# Patient Record
Sex: FEMALE | Race: BLACK OR AFRICAN AMERICAN | Employment: FULL TIME | URBAN - NONMETROPOLITAN AREA
[De-identification: names, ages, dates, MRNs, and addresses within clinical notes are randomized per-mention and may not be internally consistent; named-entity substitution may affect disease eponyms.]

---

## 2017-02-01 ENCOUNTER — OFFICE VISIT (OUTPATIENT)
Dept: PRIMARY CARE CLINIC | Age: 42
End: 2017-02-01
Payer: MEDICAID

## 2017-02-01 VITALS
HEART RATE: 74 BPM | HEIGHT: 65 IN | OXYGEN SATURATION: 98 % | DIASTOLIC BLOOD PRESSURE: 82 MMHG | SYSTOLIC BLOOD PRESSURE: 122 MMHG | TEMPERATURE: 97.4 F | BODY MASS INDEX: 33.32 KG/M2 | WEIGHT: 200 LBS

## 2017-02-01 DIAGNOSIS — I10 ESSENTIAL HYPERTENSION: Primary | ICD-10-CM

## 2017-02-01 PROCEDURE — 99213 OFFICE O/P EST LOW 20 MIN: CPT | Performed by: FAMILY MEDICINE

## 2017-02-01 ASSESSMENT — ENCOUNTER SYMPTOMS
SHORTNESS OF BREATH: 0
COUGH: 0

## 2017-08-01 ENCOUNTER — OFFICE VISIT (OUTPATIENT)
Dept: PRIMARY CARE CLINIC | Age: 42
End: 2017-08-01
Payer: MEDICAID

## 2017-08-01 VITALS
HEIGHT: 65 IN | HEART RATE: 88 BPM | TEMPERATURE: 97.2 F | WEIGHT: 199.8 LBS | OXYGEN SATURATION: 98 % | SYSTOLIC BLOOD PRESSURE: 120 MMHG | DIASTOLIC BLOOD PRESSURE: 72 MMHG | BODY MASS INDEX: 33.29 KG/M2

## 2017-08-01 DIAGNOSIS — I10 ESSENTIAL HYPERTENSION: ICD-10-CM

## 2017-08-01 PROCEDURE — 99213 OFFICE O/P EST LOW 20 MIN: CPT | Performed by: FAMILY MEDICINE

## 2017-08-01 RX ORDER — AMLODIPINE BESYLATE 5 MG/1
5 TABLET ORAL DAILY
Qty: 90 TABLET | Refills: 3 | Status: SHIPPED | OUTPATIENT
Start: 2017-08-01 | End: 2017-08-25 | Stop reason: SDUPTHER

## 2017-08-01 RX ORDER — METOPROLOL SUCCINATE 25 MG/1
25 TABLET, EXTENDED RELEASE ORAL DAILY
Qty: 90 TABLET | Refills: 3 | Status: SHIPPED | OUTPATIENT
Start: 2017-08-01 | End: 2017-08-25 | Stop reason: SDUPTHER

## 2017-08-01 ASSESSMENT — ENCOUNTER SYMPTOMS
COLOR CHANGE: 0
COUGH: 0
SHORTNESS OF BREATH: 0

## 2017-08-25 DIAGNOSIS — I10 ESSENTIAL HYPERTENSION: ICD-10-CM

## 2017-08-25 RX ORDER — METOPROLOL SUCCINATE 25 MG/1
TABLET, EXTENDED RELEASE ORAL
Qty: 90 TABLET | Refills: 2 | Status: SHIPPED | OUTPATIENT
Start: 2017-08-25 | End: 2018-09-20 | Stop reason: SDUPTHER

## 2017-08-25 RX ORDER — AMLODIPINE BESYLATE 5 MG/1
TABLET ORAL
Qty: 90 TABLET | Refills: 2 | Status: SHIPPED | OUTPATIENT
Start: 2017-08-25 | End: 2018-09-20 | Stop reason: SDUPTHER

## 2017-12-28 DIAGNOSIS — I10 ESSENTIAL HYPERTENSION: ICD-10-CM

## 2017-12-28 LAB
ALBUMIN SERPL-MCNC: 4 G/DL (ref 3.5–5.2)
ALP BLD-CCNC: 81 U/L (ref 35–104)
ALT SERPL-CCNC: 15 U/L (ref 5–33)
ANION GAP SERPL CALCULATED.3IONS-SCNC: 14 MMOL/L (ref 7–19)
AST SERPL-CCNC: 19 U/L (ref 5–32)
BILIRUB SERPL-MCNC: 0.3 MG/DL (ref 0.2–1.2)
BUN BLDV-MCNC: 13 MG/DL (ref 6–20)
CALCIUM SERPL-MCNC: 8.8 MG/DL (ref 8.6–10)
CHLORIDE BLD-SCNC: 99 MMOL/L (ref 98–111)
CHOLESTEROL, TOTAL: 191 MG/DL (ref 160–199)
CO2: 25 MMOL/L (ref 22–29)
CREAT SERPL-MCNC: 0.6 MG/DL (ref 0.5–0.9)
GFR NON-AFRICAN AMERICAN: >60
GLUCOSE BLD-MCNC: 92 MG/DL (ref 74–109)
HDLC SERPL-MCNC: 40 MG/DL (ref 65–121)
LDL CHOLESTEROL CALCULATED: 116 MG/DL
POTASSIUM SERPL-SCNC: 3.9 MMOL/L (ref 3.5–5)
SODIUM BLD-SCNC: 138 MMOL/L (ref 136–145)
TOTAL PROTEIN: 8.1 G/DL (ref 6.6–8.7)
TRIGL SERPL-MCNC: 177 MG/DL (ref 0–149)

## 2018-01-15 ENCOUNTER — TELEPHONE (OUTPATIENT)
Dept: PRIMARY CARE CLINIC | Age: 43
End: 2018-01-15

## 2018-01-15 NOTE — TELEPHONE ENCOUNTER
----- Message from SIS Lacey sent at 1/14/2018 12:51 PM CST -----  Please notify patient of normal results. HDL is slightly low, triglycerides are low high I will tweak diet sticking to a healthy heart diet. 30 minutes of low impact aerobic exercise daily I will follow up as scheduled!

## 2018-05-04 ENCOUNTER — APPOINTMENT (OUTPATIENT)
Dept: CT IMAGING | Age: 43
End: 2018-05-04
Payer: MEDICAID

## 2018-05-04 ENCOUNTER — HOSPITAL ENCOUNTER (EMERGENCY)
Age: 43
Discharge: HOME | End: 2018-05-04
Attending: EMERGENCY MEDICINE
Payer: MEDICAID

## 2018-05-04 VITALS
HEART RATE: 70 BPM | DIASTOLIC BLOOD PRESSURE: 75 MMHG | SYSTOLIC BLOOD PRESSURE: 128 MMHG | WEIGHT: 199 LBS | OXYGEN SATURATION: 99 % | TEMPERATURE: 98.7 F | RESPIRATION RATE: 16 BRPM | BODY MASS INDEX: 31.98 KG/M2 | HEIGHT: 66 IN

## 2018-05-04 DIAGNOSIS — I45.6 WOLFF PARKINSON WHITE PATTERN SEEN ON ELECTROCARDIOGRAM: ICD-10-CM

## 2018-05-04 DIAGNOSIS — R42 DIZZINESS: Primary | ICD-10-CM

## 2018-05-04 LAB
ALBUMIN SERPL-MCNC: 4.3 G/DL (ref 3.5–5.2)
ALP BLD-CCNC: 90 U/L (ref 35–104)
ALT SERPL-CCNC: 13 U/L (ref 5–33)
ANION GAP SERPL CALCULATED.3IONS-SCNC: 12 MMOL/L (ref 7–19)
AST SERPL-CCNC: 17 U/L (ref 5–32)
BASOPHILS ABSOLUTE: 0 K/UL (ref 0–0.2)
BASOPHILS RELATIVE PERCENT: 0.5 % (ref 0–1)
BILIRUB SERPL-MCNC: <0.2 MG/DL (ref 0.2–1.2)
BUN BLDV-MCNC: 9 MG/DL (ref 6–20)
CALCIUM SERPL-MCNC: 9.5 MG/DL (ref 8.6–10)
CHLORIDE BLD-SCNC: 99 MMOL/L (ref 98–111)
CO2: 27 MMOL/L (ref 22–29)
CREAT SERPL-MCNC: 0.5 MG/DL (ref 0.5–0.9)
EOSINOPHILS ABSOLUTE: 0 K/UL (ref 0–0.6)
EOSINOPHILS RELATIVE PERCENT: 0.5 % (ref 0–5)
GFR NON-AFRICAN AMERICAN: >60
GLUCOSE BLD-MCNC: 128 MG/DL (ref 74–109)
HCG QUALITATIVE: NEGATIVE
HCT VFR BLD CALC: 44.3 % (ref 37–47)
HEMOGLOBIN: 14.2 G/DL (ref 12–16)
LYMPHOCYTES ABSOLUTE: 2 K/UL (ref 1.1–4.5)
LYMPHOCYTES RELATIVE PERCENT: 31.9 % (ref 20–40)
MCH RBC QN AUTO: 27.5 PG (ref 27–31)
MCHC RBC AUTO-ENTMCNC: 32.1 G/DL (ref 33–37)
MCV RBC AUTO: 85.7 FL (ref 81–99)
MONOCYTES ABSOLUTE: 0.3 K/UL (ref 0–0.9)
MONOCYTES RELATIVE PERCENT: 5.2 % (ref 0–10)
NEUTROPHILS ABSOLUTE: 3.8 K/UL (ref 1.5–7.5)
NEUTROPHILS RELATIVE PERCENT: 61.6 % (ref 50–65)
PDW BLD-RTO: 13.1 % (ref 11.5–14.5)
PLATELET # BLD: 274 K/UL (ref 130–400)
PMV BLD AUTO: 9.5 FL (ref 9.4–12.3)
POTASSIUM SERPL-SCNC: 3.4 MMOL/L (ref 3.5–5)
RBC # BLD: 5.17 M/UL (ref 4.2–5.4)
SODIUM BLD-SCNC: 138 MMOL/L (ref 136–145)
TOTAL PROTEIN: 8.6 G/DL (ref 6.6–8.7)
TROPONIN: <0.01 NG/ML (ref 0–0.03)
WBC # BLD: 6.1 K/UL (ref 4.8–10.8)

## 2018-05-04 PROCEDURE — 84484 ASSAY OF TROPONIN QUANT: CPT

## 2018-05-04 PROCEDURE — 93005 ELECTROCARDIOGRAM TRACING: CPT

## 2018-05-04 PROCEDURE — 70450 CT HEAD/BRAIN W/O DYE: CPT

## 2018-05-04 PROCEDURE — 2580000003 HC RX 258: Performed by: EMERGENCY MEDICINE

## 2018-05-04 PROCEDURE — 99285 EMERGENCY DEPT VISIT HI MDM: CPT | Performed by: EMERGENCY MEDICINE

## 2018-05-04 PROCEDURE — 80053 COMPREHEN METABOLIC PANEL: CPT

## 2018-05-04 PROCEDURE — 93229 REMOTE 30 DAY ECG TECH SUPP: CPT

## 2018-05-04 PROCEDURE — 6370000000 HC RX 637 (ALT 250 FOR IP): Performed by: EMERGENCY MEDICINE

## 2018-05-04 PROCEDURE — 85025 COMPLETE CBC W/AUTO DIFF WBC: CPT

## 2018-05-04 PROCEDURE — 99284 EMERGENCY DEPT VISIT MOD MDM: CPT

## 2018-05-04 PROCEDURE — 36415 COLL VENOUS BLD VENIPUNCTURE: CPT

## 2018-05-04 PROCEDURE — 84703 CHORIONIC GONADOTROPIN ASSAY: CPT

## 2018-05-04 RX ORDER — 0.9 % SODIUM CHLORIDE 0.9 %
1000 INTRAVENOUS SOLUTION INTRAVENOUS ONCE
Status: COMPLETED | OUTPATIENT
Start: 2018-05-04 | End: 2018-05-04

## 2018-05-04 RX ORDER — MECLIZINE HCL 12.5 MG/1
25 TABLET ORAL ONCE
Status: COMPLETED | OUTPATIENT
Start: 2018-05-04 | End: 2018-05-04

## 2018-05-04 RX ADMIN — MECLIZINE 25 MG: 12.5 TABLET ORAL at 07:08

## 2018-05-04 RX ADMIN — SODIUM CHLORIDE 1000 ML: 9 INJECTION, SOLUTION INTRAVENOUS at 07:08

## 2018-05-04 ASSESSMENT — ENCOUNTER SYMPTOMS
VOMITING: 0
ABDOMINAL PAIN: 0
SORE THROAT: 0
BACK PAIN: 0
RHINORRHEA: 0
NAUSEA: 0
DIARRHEA: 0
SHORTNESS OF BREATH: 0

## 2018-05-04 ASSESSMENT — PAIN DESCRIPTION - LOCATION: LOCATION: HEAD

## 2018-05-04 ASSESSMENT — PAIN SCALES - GENERAL: PAINLEVEL_OUTOF10: 9

## 2018-05-07 ENCOUNTER — OFFICE VISIT (OUTPATIENT)
Dept: PRIMARY CARE CLINIC | Age: 43
End: 2018-05-07
Payer: MEDICAID

## 2018-05-07 VITALS
WEIGHT: 199 LBS | BODY MASS INDEX: 31.98 KG/M2 | TEMPERATURE: 98 F | DIASTOLIC BLOOD PRESSURE: 74 MMHG | HEIGHT: 66 IN | OXYGEN SATURATION: 100 % | RESPIRATION RATE: 20 BRPM | HEART RATE: 77 BPM | SYSTOLIC BLOOD PRESSURE: 110 MMHG

## 2018-05-07 DIAGNOSIS — R94.31 ABNORMAL EKG: ICD-10-CM

## 2018-05-07 DIAGNOSIS — R00.2 PALPITATIONS: Primary | ICD-10-CM

## 2018-05-07 DIAGNOSIS — R00.2 PALPITATIONS: ICD-10-CM

## 2018-05-07 LAB
EKG P AXIS: 60 DEGREES
EKG P-R INTERVAL: 114 MS
EKG Q-T INTERVAL: 402 MS
EKG QRS DURATION: 114 MS
EKG QTC CALCULATION (BAZETT): 444 MS
EKG T AXIS: 35 DEGREES

## 2018-05-07 PROCEDURE — 99214 OFFICE O/P EST MOD 30 MIN: CPT | Performed by: FAMILY MEDICINE

## 2018-05-07 ASSESSMENT — ENCOUNTER SYMPTOMS
DIARRHEA: 0
NAUSEA: 0
VOMITING: 0
ABDOMINAL PAIN: 0
WHEEZING: 0
SHORTNESS OF BREATH: 0
CONSTIPATION: 0
COUGH: 0
CHEST TIGHTNESS: 0

## 2018-05-09 LAB — TSH, 3RD GENERATION: 2.7 MU/L (ref 0.3–4)

## 2018-05-30 ENCOUNTER — OFFICE VISIT (OUTPATIENT)
Dept: PRIMARY CARE CLINIC | Age: 43
End: 2018-05-30
Payer: MEDICAID

## 2018-05-30 VITALS
OXYGEN SATURATION: 99 % | RESPIRATION RATE: 20 BRPM | SYSTOLIC BLOOD PRESSURE: 120 MMHG | WEIGHT: 199 LBS | DIASTOLIC BLOOD PRESSURE: 80 MMHG | HEART RATE: 70 BPM | BODY MASS INDEX: 31.98 KG/M2 | HEIGHT: 66 IN | TEMPERATURE: 98 F

## 2018-05-30 DIAGNOSIS — Z87.898 HISTORY OF PALPITATIONS: Primary | ICD-10-CM

## 2018-05-30 PROCEDURE — 99213 OFFICE O/P EST LOW 20 MIN: CPT | Performed by: FAMILY MEDICINE

## 2018-05-30 ASSESSMENT — ENCOUNTER SYMPTOMS
VOMITING: 0
CHEST TIGHTNESS: 0
COUGH: 0
CONSTIPATION: 0
SHORTNESS OF BREATH: 0
ABDOMINAL PAIN: 0
WHEEZING: 0
DIARRHEA: 0
NAUSEA: 0

## 2018-05-30 ASSESSMENT — PATIENT HEALTH QUESTIONNAIRE - PHQ9
SUM OF ALL RESPONSES TO PHQ9 QUESTIONS 1 & 2: 0
SUM OF ALL RESPONSES TO PHQ QUESTIONS 1-9: 0
1. LITTLE INTEREST OR PLEASURE IN DOING THINGS: 0
2. FEELING DOWN, DEPRESSED OR HOPELESS: 0

## 2018-06-11 PROCEDURE — 0298T PR EXT ECG > 48HR TO 21 DAY REVIEW AND INTERPRETATN: CPT | Performed by: INTERNAL MEDICINE

## 2018-09-20 DIAGNOSIS — I10 ESSENTIAL HYPERTENSION: ICD-10-CM

## 2018-09-20 RX ORDER — AMLODIPINE BESYLATE 5 MG/1
TABLET ORAL
Qty: 90 TABLET | Refills: 0 | Status: SHIPPED | OUTPATIENT
Start: 2018-09-20 | End: 2018-12-23 | Stop reason: SDUPTHER

## 2018-09-20 RX ORDER — METOPROLOL SUCCINATE 25 MG/1
TABLET, EXTENDED RELEASE ORAL
Qty: 90 TABLET | Refills: 0 | Status: SHIPPED | OUTPATIENT
Start: 2018-09-20 | End: 2018-12-23 | Stop reason: SDUPTHER

## 2019-01-28 DIAGNOSIS — I10 ESSENTIAL HYPERTENSION: ICD-10-CM

## 2019-01-28 RX ORDER — METOPROLOL SUCCINATE 25 MG/1
TABLET, EXTENDED RELEASE ORAL
Qty: 30 TABLET | Refills: 0 | Status: SHIPPED | OUTPATIENT
Start: 2019-01-28 | End: 2019-02-01 | Stop reason: SDUPTHER

## 2019-01-28 RX ORDER — AMLODIPINE BESYLATE 5 MG/1
TABLET ORAL
Qty: 30 TABLET | Refills: 0 | Status: SHIPPED | OUTPATIENT
Start: 2019-01-28 | End: 2019-02-01 | Stop reason: SDUPTHER

## 2019-01-30 RX ORDER — AMLODIPINE BESYLATE 5 MG/1
TABLET ORAL
Qty: 30 TABLET | Refills: 3 | OUTPATIENT
Start: 2019-01-30

## 2019-01-30 RX ORDER — METOPROLOL SUCCINATE 25 MG/1
TABLET, EXTENDED RELEASE ORAL
Qty: 30 TABLET | Refills: 3 | OUTPATIENT
Start: 2019-01-30

## 2019-02-01 ENCOUNTER — OFFICE VISIT (OUTPATIENT)
Dept: PRIMARY CARE CLINIC | Age: 44
End: 2019-02-01
Payer: MEDICAID

## 2019-02-01 VITALS
DIASTOLIC BLOOD PRESSURE: 80 MMHG | HEART RATE: 90 BPM | SYSTOLIC BLOOD PRESSURE: 120 MMHG | TEMPERATURE: 98.9 F | OXYGEN SATURATION: 99 % | BODY MASS INDEX: 32.99 KG/M2 | HEIGHT: 65 IN | WEIGHT: 198 LBS

## 2019-02-01 DIAGNOSIS — I10 ESSENTIAL HYPERTENSION: ICD-10-CM

## 2019-02-01 PROCEDURE — 99212 OFFICE O/P EST SF 10 MIN: CPT | Performed by: NURSE PRACTITIONER

## 2019-02-01 RX ORDER — AMLODIPINE BESYLATE 5 MG/1
TABLET ORAL
Qty: 30 TABLET | Refills: 11 | Status: SHIPPED | OUTPATIENT
Start: 2019-02-01 | End: 2019-08-09 | Stop reason: SDUPTHER

## 2019-02-01 RX ORDER — METOPROLOL SUCCINATE 25 MG/1
TABLET, EXTENDED RELEASE ORAL
Qty: 30 TABLET | Refills: 11 | Status: SHIPPED | OUTPATIENT
Start: 2019-02-01 | End: 2019-08-09 | Stop reason: SDUPTHER

## 2019-02-04 ASSESSMENT — ENCOUNTER SYMPTOMS
SINUS PRESSURE: 0
VOMITING: 0
COUGH: 0
SINUS PAIN: 0
DIARRHEA: 0
BACK PAIN: 0
EYE PAIN: 0
ABDOMINAL PAIN: 0
SHORTNESS OF BREATH: 0
WHEEZING: 0
NAUSEA: 0

## 2019-08-09 ENCOUNTER — OFFICE VISIT (OUTPATIENT)
Dept: PRIMARY CARE CLINIC | Age: 44
End: 2019-08-09
Payer: MEDICAID

## 2019-08-09 VITALS
WEIGHT: 200.8 LBS | HEART RATE: 89 BPM | HEIGHT: 66 IN | BODY MASS INDEX: 32.27 KG/M2 | TEMPERATURE: 98 F | SYSTOLIC BLOOD PRESSURE: 130 MMHG | OXYGEN SATURATION: 99 % | DIASTOLIC BLOOD PRESSURE: 86 MMHG

## 2019-08-09 DIAGNOSIS — E87.6 HYPOKALEMIA: ICD-10-CM

## 2019-08-09 DIAGNOSIS — I10 ESSENTIAL HYPERTENSION: ICD-10-CM

## 2019-08-09 DIAGNOSIS — I10 ESSENTIAL HYPERTENSION: Primary | ICD-10-CM

## 2019-08-09 LAB
ANION GAP SERPL CALCULATED.3IONS-SCNC: 14 MMOL/L (ref 7–19)
BUN BLDV-MCNC: 14 MG/DL (ref 6–20)
CALCIUM SERPL-MCNC: 9.2 MG/DL (ref 8.6–10)
CHLORIDE BLD-SCNC: 100 MMOL/L (ref 98–111)
CO2: 25 MMOL/L (ref 22–29)
CREAT SERPL-MCNC: 0.5 MG/DL (ref 0.5–0.9)
GFR NON-AFRICAN AMERICAN: >60
GLUCOSE BLD-MCNC: 98 MG/DL (ref 74–109)
POTASSIUM SERPL-SCNC: 3.3 MMOL/L (ref 3.5–5)
SODIUM BLD-SCNC: 139 MMOL/L (ref 136–145)

## 2019-08-09 PROCEDURE — 99213 OFFICE O/P EST LOW 20 MIN: CPT | Performed by: NURSE PRACTITIONER

## 2019-08-09 RX ORDER — POTASSIUM CHLORIDE 20 MEQ/1
20 TABLET, EXTENDED RELEASE ORAL ONCE
Qty: 1 TABLET | Refills: 0 | Status: SHIPPED | OUTPATIENT
Start: 2019-08-09 | End: 2019-08-09

## 2019-08-09 RX ORDER — AMLODIPINE BESYLATE 5 MG/1
TABLET ORAL
Qty: 30 TABLET | Refills: 11 | Status: SHIPPED | OUTPATIENT
Start: 2019-08-09 | End: 2020-08-19 | Stop reason: SDUPTHER

## 2019-08-09 RX ORDER — METOPROLOL SUCCINATE 25 MG/1
TABLET, EXTENDED RELEASE ORAL
Qty: 30 TABLET | Refills: 11 | Status: SHIPPED | OUTPATIENT
Start: 2019-08-09 | End: 2020-08-19 | Stop reason: SDUPTHER

## 2019-08-09 ASSESSMENT — PATIENT HEALTH QUESTIONNAIRE - PHQ9
SUM OF ALL RESPONSES TO PHQ QUESTIONS 1-9: 0
SUM OF ALL RESPONSES TO PHQ9 QUESTIONS 1 & 2: 0
2. FEELING DOWN, DEPRESSED OR HOPELESS: 0
1. LITTLE INTEREST OR PLEASURE IN DOING THINGS: 0
SUM OF ALL RESPONSES TO PHQ QUESTIONS 1-9: 0

## 2019-08-09 NOTE — PROGRESS NOTES
[facility]   [address] [place] [address], [facility]     Phone:  [phone]  Fax:  [phone]      Juan Carlos Velez is a 41 y.o. female who presents today for her medical conditions/complaints as noted below. Juan Carlos Velez is c/o of Hypertension (6 month follow up)      Chief Complaint   Patient presents with    Hypertension     6 month follow up       HPI:     HPI    Juan Carlos Velez presents today for a 6 month follow up on HTN. She states she is feeling well and taking her medications as prescribed. She attempts to eat a low-sodium diet, but does not always do this. She has no new symptoms or side effects. She denies chest pain, shortness of breath, or edema. She does not smoke. Past Medical History:   Diagnosis Date    Hypertension         Past Surgical History:   Procedure Laterality Date    TUBAL LIGATION Bilateral -5y    TUBAL LIGATION         Social History     Tobacco Use    Smoking status: Never Smoker    Smokeless tobacco: Never Used   Substance Use Topics    Alcohol use: Yes     Comment: occasional        Current Outpatient Medications   Medication Sig Dispense Refill    amLODIPine (NORVASC) 5 MG tablet TAKE 1 TABLET BY MOUTH EVERY DAY 30 tablet 11    metoprolol succinate (TOPROL XL) 25 MG extended release tablet TAKE 1 TABLET BY MOUTH DAILY 30 tablet 11    potassium chloride (KLOR-CON M) 20 MEQ extended release tablet Take 1 tablet by mouth once for 1 dose 1 tablet 0     No current facility-administered medications for this visit. Allergies   Allergen Reactions    Pollen Extract Other (See Comments)       Family History   Problem Relation Age of Onset    Cancer Mother                Review of Systems   Constitutional: Negative for appetite change, fatigue and fever. Eyes: Negative for visual disturbance. Respiratory: Negative for cough, shortness of breath and wheezing. Cardiovascular: Negative for chest pain and leg swelling. Gastrointestinal: Negative. Genitourinary: Negative. Neurological: Negative for dizziness, weakness and headaches. Objective:     Physical Exam   Constitutional: She is oriented to person, place, and time. She appears well-developed and well-nourished. No distress. HENT:   Head: Normocephalic and atraumatic. Eyes: Pupils are equal, round, and reactive to light. Neck: Normal range of motion. Neck supple. Cardiovascular: Normal rate, regular rhythm, normal heart sounds and intact distal pulses. Pulmonary/Chest: Effort normal and breath sounds normal. No respiratory distress. She has no wheezes. Musculoskeletal: Normal range of motion. She exhibits no edema. Lumbar back: She exhibits normal range of motion. Lymphadenopathy:     She has no cervical adenopathy. Neurological: She is alert and oriented to person, place, and time. Skin: Skin is warm and dry. No rash noted. Psychiatric: She has a normal mood and affect. Nursing note and vitals reviewed. /86   Pulse 89   Temp 98 °F (36.7 °C) (Temporal)   Ht 5' 6\" (1.676 m)   Wt 200 lb 12.8 oz (91.1 kg)   SpO2 99%   BMI 32.41 kg/m²     Assessment:      Diagnosis Orders   1. Essential hypertension  amLODIPine (NORVASC) 5 MG tablet    metoprolol succinate (TOPROL XL) 25 MG extended release tablet   2. Hypokalemia  Potassium    potassium chloride (KLOR-CON M) 20 MEQ extended release tablet       No results found for this visit on 08/09/19. Plan:     BMP ordered. Potassium noted to be low. Will order one-time dose of potassium and encouraged to eat potassium rich foods. Please check potassium in 1 week. Medications reordered. Please continue current medications. Blood pressure is better controlled.     Return in about 6 months (around 2/9/2020), or if symptoms worsen or fail to improve, for Physical.    Orders Placed This Encounter   Procedures    Potassium     Standing Status:   Future     Standing Expiration Date:   8/9/2020       Orders

## 2019-08-12 ASSESSMENT — ENCOUNTER SYMPTOMS
GASTROINTESTINAL NEGATIVE: 1
COUGH: 0
WHEEZING: 0
SHORTNESS OF BREATH: 0

## 2019-08-13 ENCOUNTER — TELEPHONE (OUTPATIENT)
Dept: PRIMARY CARE CLINIC | Age: 44
End: 2019-08-13

## 2020-07-20 ENCOUNTER — OFFICE VISIT (OUTPATIENT)
Age: 45
End: 2020-07-20
Payer: MEDICAID

## 2020-07-20 VITALS
HEIGHT: 67 IN | SYSTOLIC BLOOD PRESSURE: 160 MMHG | OXYGEN SATURATION: 94 % | RESPIRATION RATE: 16 BRPM | TEMPERATURE: 96.1 F | DIASTOLIC BLOOD PRESSURE: 90 MMHG | BODY MASS INDEX: 26.68 KG/M2 | WEIGHT: 170 LBS | HEART RATE: 140 BPM

## 2020-07-20 PROCEDURE — 99213 OFFICE O/P EST LOW 20 MIN: CPT | Performed by: NURSE PRACTITIONER

## 2020-07-20 RX ORDER — AMOXICILLIN AND CLAVULANATE POTASSIUM 875; 125 MG/1; MG/1
1 TABLET, FILM COATED ORAL 2 TIMES DAILY
Qty: 20 TABLET | Refills: 0 | Status: SHIPPED | OUTPATIENT
Start: 2020-07-20 | End: 2020-07-30

## 2020-07-20 ASSESSMENT — ENCOUNTER SYMPTOMS
SINUS PRESSURE: 1
NAUSEA: 0
RHINORRHEA: 0
COUGH: 0
ABDOMINAL PAIN: 0
SORE THROAT: 0
VOMITING: 0
SHORTNESS OF BREATH: 0
SINUS PAIN: 1
DIARRHEA: 0

## 2020-07-20 NOTE — PATIENT INSTRUCTIONS
Patient Education        Sinusitis: Care Instructions  Your Care Instructions        Sinusitis is an infection of the lining of the sinus cavities in your head. Sinusitis often follows a cold. It causes pain and pressure in your head and face. In most cases, sinusitis gets better on its own in 1 to 2 weeks. But some mild symptoms may last for several weeks. Sometimes antibiotics are needed. Follow-up care is a key part of your treatment and safety. Be sure to make and go to all appointments, and call your doctor if you are having problems. It's also a good idea to know your test results and keep a list of the medicines you take. How can you care for yourself at home? · Take an over-the-counter pain medicine, such as acetaminophen (Tylenol), ibuprofen (Advil, Motrin), or naproxen (Aleve). Read and follow all instructions on the label. · If the doctor prescribed antibiotics, take them as directed. Do not stop taking them just because you feel better. You need to take the full course of antibiotics. · Be careful when taking over-the-counter cold or flu medicines and Tylenol at the same time. Many of these medicines have acetaminophen, which is Tylenol. Read the labels to make sure that you are not taking more than the recommended dose. Too much acetaminophen (Tylenol) can be harmful. · Breathe warm, moist air from a steamy shower, a hot bath, or a sink filled with hot water. Avoid cold, dry air. Using a humidifier in your home may help. Follow the directions for cleaning the machine. · Use saline (saltwater) nasal washes to help keep your nasal passages open and wash out mucus and bacteria. You can buy saline nose drops at a grocery store or drugstore. Or you can make your own at home by adding 1 teaspoon of salt and 1 teaspoon of baking soda to 2 cups of distilled water. If you make your own, fill a bulb syringe with the solution, insert the tip into your nostril, and squeeze gently. Canda Plan your nose.   · Put a hot, wet towel or a warm gel pack on your face 3 or 4 times a day for 5 to 10 minutes each time. · Try a decongestant nasal spray like oxymetazoline (Afrin). Do not use it for more than 3 days in a row. Using it for more than 3 days can make your congestion worse. When should you call for help? Call your doctor now or seek immediate medical care if:  · You have new or worse swelling or redness in your face or around your eyes. · You have a new or higher fever. Watch closely for changes in your health, and be sure to contact your doctor if:  · You have new or worse facial pain. · The mucus from your nose becomes thicker (like pus) or has new blood in it. · You are not getting better as expected. Where can you learn more? Go to https://EyestormpeR&M Engineeringeb.[employer]. org and sign in to your Artaic account. Enter G948 in the [employer] box to learn more about \"Sinusitis: Care Instructions. \"     If you do not have an account, please click on the \"Sign Up Now\" link. Current as of: July 29, 2019               Content Version: 12.5  © 4040-0986 [employer]. Care instructions adapted under license by [facility] ([facility]). If you have questions about a medical condition or this instruction, always ask your healthcare professional. [address] any warranty or liability for your use of this information. Patient Education        Learning About Coronavirus [phone])  Coronavirus [phone]): Overview  What is coronavirus (MUJCQ-50)? The coronavirus disease (COVID-19) is caused by a virus. It is an illness that was first found in [place], [place], in December 2019. It has since spread worldwide. The virus can cause fever, cough, and trouble breathing. In severe cases, it can cause pneumonia and make it hard to breathe without help. It can cause death. Coronaviruses are a large group of viruses. They cause the common cold.  They also cause more serious illnesses like Middle East respiratory syndrome (MERS) and severe acute respiratory syndrome (SARS). COVID-19 is caused by a novel coronavirus. That means it's a new type that has not been seen in people before. This virus spreads person-to-person through droplets from coughing and sneezing. It can also spread when you are close to someone who is infected. And it can spread when you touch something that has the virus on it, such as a doorknob or a tabletop. What can you do to protect yourself from coronavirus (COVID-19)? The best way to protect yourself from getting sick is to:  · Avoid areas where there is an outbreak. · Avoid contact with people who may be infected. · Wash your hands often with soap or alcohol-based hand sanitizers. · Avoid crowds and try to stay at least 6 feet away from other people. · Wash your hands often, especially after you cough or sneeze. Use soap and water, and scrub for at least 20 seconds. If soap and water aren't available, use an alcohol-based hand . · Avoid touching your mouth, nose, and eyes. What can you do to avoid spreading the virus to others? To help avoid spreading the virus to others:  · Cover your mouth with a tissue when you cough or sneeze. Then throw the tissue in the trash. · Use a disinfectant to clean things that you touch often. · Wear a cloth face cover if you have to go to public areas. · Stay home if you are sick or have been exposed to the virus. Don't go to school, work, or public areas. And don't use public transportation, ride-shares, or taxis unless you have no choice. · If you are sick:  ? Leave your home only if you need to get medical care. But call the doctor's office first so they know you're coming. And wear a face cover. ? Wear the face cover whenever you're around other people. It can help stop the spread of the virus when you cough or sneeze. ? Clean and disinfect your home every day. Use household  and disinfectant wipes or sprays. until results are called to you. If positive you will have to quarantine for 14days. If positive the health dept will also contact you. 3. Monitor for fever and treat as needed with tylenol or ibuprofen  4. If patient is not improving or developing any new/worsening symptoms then return to clinic as needed or go to ER. Patient is to follow up with PCP as needed.        Antibiotic as prescribed

## 2020-07-20 NOTE — PROGRESS NOTES
[address]  [address] [address]  Dept: [phone]  Dept Fax: [phone]  Loc: [phone]      Watsonnay Magdi is c/o of Headache        HPI:     Patient complains of 2 day(s) history of headache. Symptoms have been worsening with time. She denies any other symptoms . Reported history of sinus infections. States it feels similar. Works at [employer]. Smoking history:  She  reports that she has never smoked. She has never used smokeless tobacco.     She has had no known ill contacts. Treatment to date: Acetaminophen. Recent travel or possible COVID exposure no    Past Medical History:   Diagnosis Date    Hypertension       Current Outpatient Medications   Medication Sig Dispense Refill    amoxicillin-clavulanate (AUGMENTIN) 875-125 MG per tablet Take 1 tablet by mouth 2 times daily for 10 days 20 tablet 0    amLODIPine (NORVASC) 5 MG tablet TAKE 1 TABLET BY MOUTH EVERY DAY 30 tablet 11    metoprolol succinate (TOPROL XL) 25 MG extended release tablet TAKE 1 TABLET BY MOUTH DAILY 30 tablet 11    potassium chloride (KLOR-CON M) 20 MEQ extended release tablet Take 1 tablet by mouth once for 1 dose 1 tablet 0     No current facility-administered medications for this visit.       Allergies   Allergen Reactions    Pollen Extract Other (See Comments)       Health Maintenance   Topic Date Due    DTaP/Tdap/Td vaccine (1 - Tdap) 08/09/2020 (Originally 10/11/1996)    Cervical cancer screen  08/09/2020 (Originally 8/5/2018)    HIV screen  08/09/2020 (Originally 10/11/1992)    Potassium monitoring  08/09/2020    Creatinine monitoring  08/09/2020    Flu vaccine (1) 09/01/2020    Lipid screen  12/28/2022    Hepatitis A vaccine  Aged Out    Hepatitis B vaccine  Aged Out    Hib vaccine  Aged Out    Meningococcal (ACWY) vaccine  Aged Out    Pneumococcal 0-64 years Vaccine  Aged Out       Subjective:      Review of Systems   Constitutional: Negative for chills, fatigue and fever. HENT: Positive for congestion, sinus pressure and sinus pain. Negative for ear pain, rhinorrhea and sore throat. Respiratory: Negative for cough and shortness of breath. Gastrointestinal: Negative for abdominal pain, diarrhea, nausea and vomiting. Skin: Negative for rash. Neurological: Positive for headaches. All other systems reviewed and are negative. Objective:     Physical Exam  Vitals signs and nursing note reviewed. Constitutional:       General: She is not in acute distress. Appearance: Normal appearance. She is well-developed. She is ill-appearing. She is not diaphoretic. HENT:      Head: Normocephalic and atraumatic. Right Ear: Tympanic membrane, ear canal and external ear normal.      Left Ear: Tympanic membrane, ear canal and external ear normal.      Nose: Congestion present. Mouth/Throat:      Mouth: Mucous membranes are moist.      Pharynx: Oropharynx is clear. No posterior oropharyngeal erythema. Eyes:      Conjunctiva/sclera: Conjunctivae normal.      Pupils: Pupils are equal, round, and reactive to light. Neck:      Musculoskeletal: Normal range of motion. Cardiovascular:      Rate and Rhythm: Normal rate and regular rhythm. Heart sounds: Normal heart sounds. No murmur. Pulmonary:      Effort: Pulmonary effort is normal. No respiratory distress. Breath sounds: Normal breath sounds. No wheezing. Skin:     General: Skin is warm and dry. Findings: No rash. Neurological:      Mental Status: She is alert and oriented to person, place, and time. Psychiatric:         Mood and Affect: Mood normal.         Behavior: Behavior normal.       BP (!) 160/90 (Site: Right Upper Arm)   Pulse 140   Temp 96.1 °F (35.6 °C) (Infrared)   Resp 16   Ht 5' 7\" (1.702 m)   Wt 170 lb (77.1 kg)   SpO2 94%   BMI 26.63 kg/m²   No results found for this visit on 07/20/20.   Assessment/ Plan Diagnosis Orders   1. Acute sinusitis, recurrence not specified, unspecified location  amoxicillin-clavulanate (AUGMENTIN) 875-125 MG per tablet   2. 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 BP elevated today. Pt has history HTN is taking her medicine. 1. Rest and increase fluid intake. 2. Covid-19 testing. Quarantine at home until results are called to you. If positive you will have to quarantine for 14days. If positive the health dept will also contact you. 3. Monitor for fever and treat as needed with tylenol or ibuprofen  4. If patient is not improving or developing any new/worsening symptoms then return to clinic as needed or go to ER. Patient is to follow up with PCP as needed. Antibiotic as prescribed       Patient Education        Sinusitis: Care Instructions  Your Care Instructions        Sinusitis is an infection of the lining of the sinus cavities in your head. Sinusitis often follows a cold. It causes pain and pressure in your head and face. In most cases, sinusitis gets better on its own in 1 to 2 weeks. But some mild symptoms may last for several weeks. Sometimes antibiotics are needed. Follow-up care is a key part of your treatment and safety. Be sure to make and go to all appointments, and call your doctor if you are having problems. It's also a good idea to know your test results and keep a list of the medicines you take. How can you care for yourself at home? · Take an over-the-counter pain medicine, such as acetaminophen (Tylenol), ibuprofen (Advil, Motrin), or naproxen (Aleve). Read and follow all instructions on the label. · If the doctor prescribed antibiotics, take them as directed. Do not stop taking them just because you feel better. You need to take the full course of antibiotics. · Be careful when taking over-the-counter cold or flu medicines and Tylenol at the same time.  Many of these medicines have acetaminophen, which is Tylenol. Read the labels to make sure that you are not taking more than the recommended dose. Too much acetaminophen (Tylenol) can be harmful. · Breathe warm, moist air from a steamy shower, a hot bath, or a sink filled with hot water. Avoid cold, dry air. Using a humidifier in your home may help. Follow the directions for cleaning the machine. · Use saline (saltwater) nasal washes to help keep your nasal passages open and wash out mucus and bacteria. You can buy saline nose drops at a grocery store or drugstore. Or you can make your own at home by adding 1 teaspoon of salt and 1 teaspoon of baking soda to 2 cups of distilled water. If you make your own, fill a bulb syringe with the solution, insert the tip into your nostril, and squeeze gently. Libra Led your nose. · Put a hot, wet towel or a warm gel pack on your face 3 or 4 times a day for 5 to 10 minutes each time. · Try a decongestant nasal spray like oxymetazoline (Afrin). Do not use it for more than 3 days in a row. Using it for more than 3 days can make your congestion worse. When should you call for help? Call your doctor now or seek immediate medical care if:  · You have new or worse swelling or redness in your face or around your eyes. · You have a new or higher fever. Watch closely for changes in your health, and be sure to contact your doctor if:  · You have new or worse facial pain. · The mucus from your nose becomes thicker (like pus) or has new blood in it. · You are not getting better as expected. Where can you learn more? Go to https://FactorlipePublereb.Itugo. org and sign in to your Shortlist account. Enter S407 in the [facility] box to learn more about \"Sinusitis: Care Instructions. \"     If you do not have an account, please click on the \"Sign Up Now\" link. Current as of: July 29, 2019               Content Version: 12.5  © 7544-5578 [employer].    Care instructions adapted under license by [facility] ([facility]). If you have questions about a medical condition or this instruction, always ask your healthcare professional. [address] any warranty or liability for your use of this information. Patient Education        Learning About Coronavirus [phone])  Coronavirus [phone]): Overview  What is coronavirus (BGKUC-09)? The coronavirus disease (COVID-19) is caused by a virus. It is an illness that was first found in [place], [place], in December 2019. It has since spread worldwide. The virus can cause fever, cough, and trouble breathing. In severe cases, it can cause pneumonia and make it hard to breathe without help. It can cause death. Coronaviruses are a large group of viruses. They cause the common cold. They also cause more serious illnesses like Middle East respiratory syndrome (MERS) and severe acute respiratory syndrome (SARS). COVID-19 is caused by a novel coronavirus. That means it's a new type that has not been seen in people before. This virus spreads person-to-person through droplets from coughing and sneezing. It can also spread when you are close to someone who is infected. And it can spread when you touch something that has the virus on it, such as a doorknob or a tabletop. What can you do to protect yourself from coronavirus (COVID-19)? The best way to protect yourself from getting sick is to:  · Avoid areas where there is an outbreak. · Avoid contact with people who may be infected. · Wash your hands often with soap or alcohol-based hand sanitizers. · Avoid crowds and try to stay at least 6 feet away from other people. · Wash your hands often, especially after you cough or sneeze. Use soap and water, and scrub for at least 20 seconds. If soap and water aren't available, use an alcohol-based hand . · Avoid touching your mouth, nose, and eyes. What can you do to avoid spreading the virus to others?   To help avoid spreading the virus to been exposed to the virus. · U.S. Centers for Disease Control and Prevention (CDC): The CDC provides updated news about the disease and travel advice. The website also tells you how to prevent the spread of infection. www.cdc.gov  · World Health Organization [facility]): WHO offers information about the virus outbreaks. WHO also has travel advice. www.who.int  Current as of: May 8, 2020               Content Version: 12.5  © 2006-2020 [employer]. Care instructions adapted under license by [employer]. If you have questions about a medical condition or this instruction, always ask your healthcare professional. [place] [address] any warranty or liability for your use of this information. Patient given educational materials - see patient instructions. Discussed use, benefit, and side effects of prescribed medications. All patient questions answered. Pt voiced understanding. Patient agreedwith treatment plan.  Follow up as needed      Electronically signed by SIS Solis on 7/20/2020 at 1:23 PM

## 2020-07-23 LAB
REPORT: ABNORMAL
SARS-COV-2: DETECTED
THIS TEST SENT TO: ABNORMAL

## 2020-08-03 ENCOUNTER — OFFICE VISIT (OUTPATIENT)
Age: 45
End: 2020-08-03

## 2020-08-03 VITALS — TEMPERATURE: 97.6 F | HEART RATE: 113 BPM | OXYGEN SATURATION: 100 %

## 2020-08-05 LAB — SARS-COV-2, NAA: NOT DETECTED

## 2020-08-19 RX ORDER — METOPROLOL SUCCINATE 25 MG/1
TABLET, EXTENDED RELEASE ORAL
Qty: 30 TABLET | Refills: 11 | Status: SHIPPED | OUTPATIENT
Start: 2020-08-19

## 2020-08-19 RX ORDER — AMLODIPINE BESYLATE 5 MG/1
TABLET ORAL
Qty: 30 TABLET | Refills: 11 | Status: SHIPPED | OUTPATIENT
Start: 2020-08-19

## 2020-08-19 NOTE — TELEPHONE ENCOUNTER
Michelle is requesting a refill of their   Requested Prescriptions     Pending Prescriptions Disp Refills    amLODIPine (NORVASC) 5 MG tablet 30 tablet 11     Sig: TAKE 1 TABLET BY MOUTH EVERY DAY    metoprolol succinate (TOPROL XL) 25 MG extended release tablet 30 tablet 11     Sig: TAKE 1 TABLET BY MOUTH DAILY   . Please advise.       Last Appt:  Visit date not found  Next Appt:   Visit date not found  Preferred pharmacy:

## 2020-10-29 ENCOUNTER — OFFICE VISIT (OUTPATIENT)
Age: 45
End: 2020-10-29

## 2020-10-29 VITALS — OXYGEN SATURATION: 99 % | TEMPERATURE: 97 F | HEART RATE: 101 BPM

## 2020-10-31 LAB — SARS-COV-2, NAA: NOT DETECTED

## 2023-10-28 ENCOUNTER — APPOINTMENT (OUTPATIENT)
Dept: GENERAL RADIOLOGY | Facility: HOSPITAL | Age: 48
DRG: 305 | End: 2023-10-28
Payer: COMMERCIAL

## 2023-10-28 ENCOUNTER — HOSPITAL ENCOUNTER (INPATIENT)
Facility: HOSPITAL | Age: 48
LOS: 1 days | Discharge: HOME | DRG: 305 | End: 2023-10-29
Attending: STUDENT IN AN ORGANIZED HEALTH CARE EDUCATION/TRAINING PROGRAM | Admitting: INTERNAL MEDICINE
Payer: COMMERCIAL

## 2023-10-28 ENCOUNTER — APPOINTMENT (OUTPATIENT)
Dept: CARDIOLOGY | Facility: HOSPITAL | Age: 48
DRG: 305 | End: 2023-10-28
Payer: COMMERCIAL

## 2023-10-28 ENCOUNTER — APPOINTMENT (OUTPATIENT)
Dept: CT IMAGING | Facility: HOSPITAL | Age: 48
DRG: 305 | End: 2023-10-28
Payer: COMMERCIAL

## 2023-10-28 DIAGNOSIS — I16.0 HYPERTENSIVE URGENCY: Primary | ICD-10-CM

## 2023-10-28 LAB
ALBUMIN SERPL-MCNC: 4 G/DL (ref 3.5–5.2)
ALBUMIN/GLOB SERPL: 1 G/DL
ALP SERPL-CCNC: 90 U/L (ref 39–117)
ALT SERPL W P-5'-P-CCNC: 11 U/L (ref 1–33)
ANION GAP SERPL CALCULATED.3IONS-SCNC: 13 MMOL/L (ref 5–15)
ANION GAP SERPL CALCULATED.3IONS-SCNC: 13 MMOL/L (ref 5–15)
AST SERPL-CCNC: 15 U/L (ref 1–32)
BASOPHILS # BLD AUTO: 0.02 10*3/MM3 (ref 0–0.2)
BASOPHILS # BLD AUTO: 0.03 10*3/MM3 (ref 0–0.2)
BASOPHILS NFR BLD AUTO: 0.2 % (ref 0–1.5)
BASOPHILS NFR BLD AUTO: 0.4 % (ref 0–1.5)
BH CV ECHO MEAS - AO MAX PG: 9.9 MMHG
BH CV ECHO MEAS - AO MEAN PG: 5 MMHG
BH CV ECHO MEAS - AO ROOT DIAM: 2.6 CM
BH CV ECHO MEAS - AO V2 MAX: 157 CM/SEC
BH CV ECHO MEAS - AO V2 VTI: 25.5 CM
BH CV ECHO MEAS - AVA(I,D): 2 CM2
BH CV ECHO MEAS - EDV(CUBED): 68.9 ML
BH CV ECHO MEAS - EDV(MOD-SP4): 62.5 ML
BH CV ECHO MEAS - EF(MOD-SP4): 55.7 %
BH CV ECHO MEAS - ESV(CUBED): 12.2 ML
BH CV ECHO MEAS - ESV(MOD-SP4): 27.7 ML
BH CV ECHO MEAS - FS: 43.9 %
BH CV ECHO MEAS - IVS/LVPW: 0.93 CM
BH CV ECHO MEAS - IVSD: 1.3 CM
BH CV ECHO MEAS - LA DIMENSION: 3.5 CM
BH CV ECHO MEAS - LAT PEAK E' VEL: 6.4 CM/SEC
BH CV ECHO MEAS - LV DIASTOLIC VOL/BSA (35-75): 30.9 CM2
BH CV ECHO MEAS - LV MASS(C)D: 204.9 GRAMS
BH CV ECHO MEAS - LV MAX PG: 4 MMHG
BH CV ECHO MEAS - LV MEAN PG: 2 MMHG
BH CV ECHO MEAS - LV SYSTOLIC VOL/BSA (12-30): 13.7 CM2
BH CV ECHO MEAS - LV V1 MAX: 100 CM/SEC
BH CV ECHO MEAS - LV V1 VTI: 16.2 CM
BH CV ECHO MEAS - LVIDD: 4.1 CM
BH CV ECHO MEAS - LVIDS: 2.3 CM
BH CV ECHO MEAS - LVOT AREA: 3.1 CM2
BH CV ECHO MEAS - LVOT DIAM: 2 CM
BH CV ECHO MEAS - LVPWD: 1.4 CM
BH CV ECHO MEAS - MED PEAK E' VEL: 5.9 CM/SEC
BH CV ECHO MEAS - MV A MAX VEL: 88.7 CM/SEC
BH CV ECHO MEAS - MV DEC TIME: 0.18 SEC
BH CV ECHO MEAS - MV E MAX VEL: 79.7 CM/SEC
BH CV ECHO MEAS - MV E/A: 0.9
BH CV ECHO MEAS - SI(MOD-SP4): 17.2 ML/M2
BH CV ECHO MEAS - SV(LVOT): 50.9 ML
BH CV ECHO MEAS - SV(MOD-SP4): 34.8 ML
BH CV ECHO MEASUREMENTS AVERAGE E/E' RATIO: 12.96
BILIRUB SERPL-MCNC: 0.2 MG/DL (ref 0–1.2)
BUN SERPL-MCNC: 12 MG/DL (ref 6–20)
BUN SERPL-MCNC: 16 MG/DL (ref 6–20)
BUN/CREAT SERPL: 22.5 (ref 7–25)
BUN/CREAT SERPL: 22.6 (ref 7–25)
CALCIUM SPEC-SCNC: 9 MG/DL (ref 8.6–10.5)
CALCIUM SPEC-SCNC: 9 MG/DL (ref 8.6–10.5)
CHLORIDE SERPL-SCNC: 102 MMOL/L (ref 98–107)
CHLORIDE SERPL-SCNC: 99 MMOL/L (ref 98–107)
CHOLEST SERPL-MCNC: 190 MG/DL (ref 0–200)
CO2 SERPL-SCNC: 22 MMOL/L (ref 22–29)
CO2 SERPL-SCNC: 23 MMOL/L (ref 22–29)
CREAT SERPL-MCNC: 0.53 MG/DL (ref 0.57–1)
CREAT SERPL-MCNC: 0.71 MG/DL (ref 0.57–1)
DEPRECATED RDW RBC AUTO: 40.5 FL (ref 37–54)
DEPRECATED RDW RBC AUTO: 41.3 FL (ref 37–54)
EGFRCR SERPLBLD CKD-EPI 2021: 106.3 ML/MIN/1.73
EGFRCR SERPLBLD CKD-EPI 2021: 115.7 ML/MIN/1.73
EOSINOPHIL # BLD AUTO: 0 10*3/MM3 (ref 0–0.4)
EOSINOPHIL # BLD AUTO: 0.12 10*3/MM3 (ref 0–0.4)
EOSINOPHIL NFR BLD AUTO: 0 % (ref 0.3–6.2)
EOSINOPHIL NFR BLD AUTO: 1.7 % (ref 0.3–6.2)
ERYTHROCYTE [DISTWIDTH] IN BLOOD BY AUTOMATED COUNT: 13.9 % (ref 12.3–15.4)
ERYTHROCYTE [DISTWIDTH] IN BLOOD BY AUTOMATED COUNT: 14 % (ref 12.3–15.4)
GEN 5 2HR TROPONIN T REFLEX: 9 NG/L
GLOBULIN UR ELPH-MCNC: 4.1 GM/DL
GLUCOSE SERPL-MCNC: 120 MG/DL (ref 65–99)
GLUCOSE SERPL-MCNC: 136 MG/DL (ref 65–99)
HBA1C MFR BLD: 6 % (ref 4.8–5.6)
HCT VFR BLD AUTO: 38.6 % (ref 34–46.6)
HCT VFR BLD AUTO: 39.7 % (ref 34–46.6)
HDLC SERPL-MCNC: 41 MG/DL (ref 40–60)
HGB BLD-MCNC: 11.8 G/DL (ref 12–15.9)
HGB BLD-MCNC: 12.1 G/DL (ref 12–15.9)
IMM GRANULOCYTES # BLD AUTO: 0.02 10*3/MM3 (ref 0–0.05)
IMM GRANULOCYTES # BLD AUTO: 0.03 10*3/MM3 (ref 0–0.05)
IMM GRANULOCYTES NFR BLD AUTO: 0.3 % (ref 0–0.5)
IMM GRANULOCYTES NFR BLD AUTO: 0.4 % (ref 0–0.5)
LDLC SERPL CALC-MCNC: 136 MG/DL (ref 0–100)
LDLC/HDLC SERPL: 3.28 {RATIO}
LEFT ATRIUM VOLUME INDEX: 21.8 ML/M2
LEFT ATRIUM VOLUME: 44.1 ML
LYMPHOCYTES # BLD AUTO: 1.49 10*3/MM3 (ref 0.7–3.1)
LYMPHOCYTES # BLD AUTO: 2.85 10*3/MM3 (ref 0.7–3.1)
LYMPHOCYTES NFR BLD AUTO: 18.3 % (ref 19.6–45.3)
LYMPHOCYTES NFR BLD AUTO: 40.4 % (ref 19.6–45.3)
MCH RBC QN AUTO: 24.6 PG (ref 26.6–33)
MCH RBC QN AUTO: 24.9 PG (ref 26.6–33)
MCHC RBC AUTO-ENTMCNC: 30.5 G/DL (ref 31.5–35.7)
MCHC RBC AUTO-ENTMCNC: 30.6 G/DL (ref 31.5–35.7)
MCV RBC AUTO: 80.9 FL (ref 79–97)
MCV RBC AUTO: 81.6 FL (ref 79–97)
MONOCYTES # BLD AUTO: 0.28 10*3/MM3 (ref 0.1–0.9)
MONOCYTES # BLD AUTO: 0.36 10*3/MM3 (ref 0.1–0.9)
MONOCYTES NFR BLD AUTO: 3.4 % (ref 5–12)
MONOCYTES NFR BLD AUTO: 5.1 % (ref 5–12)
NEUTROPHILS NFR BLD AUTO: 3.67 10*3/MM3 (ref 1.7–7)
NEUTROPHILS NFR BLD AUTO: 52.1 % (ref 42.7–76)
NEUTROPHILS NFR BLD AUTO: 6.33 10*3/MM3 (ref 1.7–7)
NEUTROPHILS NFR BLD AUTO: 77.7 % (ref 42.7–76)
NRBC BLD AUTO-RTO: 0 /100 WBC (ref 0–0.2)
NRBC BLD AUTO-RTO: 0 /100 WBC (ref 0–0.2)
PLATELET # BLD AUTO: 336 10*3/MM3 (ref 140–450)
PLATELET # BLD AUTO: 361 10*3/MM3 (ref 140–450)
PMV BLD AUTO: 10 FL (ref 6–12)
PMV BLD AUTO: 10.1 FL (ref 6–12)
POTASSIUM SERPL-SCNC: 3.3 MMOL/L (ref 3.5–5.2)
POTASSIUM SERPL-SCNC: 3.5 MMOL/L (ref 3.5–5.2)
PROT SERPL-MCNC: 8.1 G/DL (ref 6–8.5)
RBC # BLD AUTO: 4.73 10*6/MM3 (ref 3.77–5.28)
RBC # BLD AUTO: 4.91 10*6/MM3 (ref 3.77–5.28)
SODIUM SERPL-SCNC: 135 MMOL/L (ref 136–145)
SODIUM SERPL-SCNC: 137 MMOL/L (ref 136–145)
TRIGL SERPL-MCNC: 72 MG/DL (ref 0–150)
TROPONIN T DELTA: 2 NG/L
TROPONIN T SERPL HS-MCNC: 7 NG/L
VLDLC SERPL-MCNC: 13 MG/DL (ref 5–40)
WBC NRBC COR # BLD: 7.05 10*3/MM3 (ref 3.4–10.8)
WBC NRBC COR # BLD: 8.15 10*3/MM3 (ref 3.4–10.8)

## 2023-10-28 PROCEDURE — 83036 HEMOGLOBIN GLYCOSYLATED A1C: CPT | Performed by: INTERNAL MEDICINE

## 2023-10-28 PROCEDURE — 93005 ELECTROCARDIOGRAM TRACING: CPT

## 2023-10-28 PROCEDURE — 71275 CT ANGIOGRAPHY CHEST: CPT

## 2023-10-28 PROCEDURE — 36415 COLL VENOUS BLD VENIPUNCTURE: CPT

## 2023-10-28 PROCEDURE — 85025 COMPLETE CBC W/AUTO DIFF WBC: CPT | Performed by: INTERNAL MEDICINE

## 2023-10-28 PROCEDURE — 84484 ASSAY OF TROPONIN QUANT: CPT | Performed by: STUDENT IN AN ORGANIZED HEALTH CARE EDUCATION/TRAINING PROGRAM

## 2023-10-28 PROCEDURE — 93306 TTE W/DOPPLER COMPLETE: CPT

## 2023-10-28 PROCEDURE — 93306 TTE W/DOPPLER COMPLETE: CPT | Performed by: INTERNAL MEDICINE

## 2023-10-28 PROCEDURE — 25510000001 IOPAMIDOL PER 1 ML: Performed by: STUDENT IN AN ORGANIZED HEALTH CARE EDUCATION/TRAINING PROGRAM

## 2023-10-28 PROCEDURE — 25010000002 HYDRALAZINE PER 20 MG: Performed by: STUDENT IN AN ORGANIZED HEALTH CARE EDUCATION/TRAINING PROGRAM

## 2023-10-28 PROCEDURE — 85025 COMPLETE CBC W/AUTO DIFF WBC: CPT | Performed by: STUDENT IN AN ORGANIZED HEALTH CARE EDUCATION/TRAINING PROGRAM

## 2023-10-28 PROCEDURE — 80053 COMPREHEN METABOLIC PANEL: CPT | Performed by: INTERNAL MEDICINE

## 2023-10-28 PROCEDURE — 80061 LIPID PANEL: CPT | Performed by: INTERNAL MEDICINE

## 2023-10-28 PROCEDURE — 99285 EMERGENCY DEPT VISIT HI MDM: CPT

## 2023-10-28 PROCEDURE — 71045 X-RAY EXAM CHEST 1 VIEW: CPT

## 2023-10-28 PROCEDURE — 93010 ELECTROCARDIOGRAM REPORT: CPT | Performed by: INTERNAL MEDICINE

## 2023-10-28 PROCEDURE — 70450 CT HEAD/BRAIN W/O DYE: CPT

## 2023-10-28 PROCEDURE — 25810000003 SODIUM CHLORIDE 0.9 % SOLUTION: Performed by: STUDENT IN AN ORGANIZED HEALTH CARE EDUCATION/TRAINING PROGRAM

## 2023-10-28 RX ORDER — CHLORHEXIDINE GLUCONATE 500 MG/1
1 CLOTH TOPICAL EVERY 24 HOURS
Status: DISCONTINUED | OUTPATIENT
Start: 2023-10-29 | End: 2023-10-29 | Stop reason: HOSPADM

## 2023-10-28 RX ORDER — LOSARTAN POTASSIUM 50 MG/1
50 TABLET ORAL
Status: DISCONTINUED | OUTPATIENT
Start: 2023-10-28 | End: 2023-10-29 | Stop reason: HOSPADM

## 2023-10-28 RX ORDER — SODIUM CHLORIDE 9 MG/ML
40 INJECTION, SOLUTION INTRAVENOUS AS NEEDED
Status: DISCONTINUED | OUTPATIENT
Start: 2023-10-28 | End: 2023-10-29 | Stop reason: HOSPADM

## 2023-10-28 RX ORDER — ACETAMINOPHEN 650 MG/1
650 SUPPOSITORY RECTAL EVERY 4 HOURS PRN
Status: DISCONTINUED | OUTPATIENT
Start: 2023-10-28 | End: 2023-10-29 | Stop reason: HOSPADM

## 2023-10-28 RX ORDER — POTASSIUM CHLORIDE 750 MG/1
10 CAPSULE, EXTENDED RELEASE ORAL DAILY
Status: DISCONTINUED | OUTPATIENT
Start: 2023-10-28 | End: 2023-10-29 | Stop reason: HOSPADM

## 2023-10-28 RX ORDER — SODIUM CHLORIDE 0.9 % (FLUSH) 0.9 %
10 SYRINGE (ML) INJECTION AS NEEDED
Status: DISCONTINUED | OUTPATIENT
Start: 2023-10-28 | End: 2023-10-29 | Stop reason: HOSPADM

## 2023-10-28 RX ORDER — CARVEDILOL 3.12 MG/1
3.12 TABLET ORAL 2 TIMES DAILY WITH MEALS
Status: DISCONTINUED | OUTPATIENT
Start: 2023-10-28 | End: 2023-10-29 | Stop reason: HOSPADM

## 2023-10-28 RX ORDER — AMOXICILLIN 250 MG
2 CAPSULE ORAL 2 TIMES DAILY
Status: DISCONTINUED | OUTPATIENT
Start: 2023-10-28 | End: 2023-10-29 | Stop reason: HOSPADM

## 2023-10-28 RX ORDER — CHLORHEXIDINE GLUCONATE 500 MG/1
1 CLOTH TOPICAL ONCE
Status: COMPLETED | OUTPATIENT
Start: 2023-10-28 | End: 2023-10-28

## 2023-10-28 RX ORDER — ONDANSETRON 2 MG/ML
4 INJECTION INTRAMUSCULAR; INTRAVENOUS EVERY 6 HOURS PRN
Status: DISCONTINUED | OUTPATIENT
Start: 2023-10-28 | End: 2023-10-29 | Stop reason: HOSPADM

## 2023-10-28 RX ORDER — HYDRALAZINE HYDROCHLORIDE 20 MG/ML
10 INJECTION INTRAMUSCULAR; INTRAVENOUS ONCE
Status: COMPLETED | OUTPATIENT
Start: 2023-10-28 | End: 2023-10-28

## 2023-10-28 RX ORDER — NITROGLYCERIN 0.4 MG/1
0.4 TABLET SUBLINGUAL
Status: DISCONTINUED | OUTPATIENT
Start: 2023-10-28 | End: 2023-10-28 | Stop reason: SDUPTHER

## 2023-10-28 RX ORDER — BISACODYL 5 MG/1
5 TABLET, DELAYED RELEASE ORAL DAILY PRN
Status: DISCONTINUED | OUTPATIENT
Start: 2023-10-28 | End: 2023-10-29 | Stop reason: HOSPADM

## 2023-10-28 RX ORDER — DILTIAZEM HCL/D5W 125 MG/125
5-15 PLASTIC BAG, INJECTION (ML) INTRAVENOUS
Status: DISCONTINUED | OUTPATIENT
Start: 2023-10-28 | End: 2023-10-28

## 2023-10-28 RX ORDER — ACETAMINOPHEN 325 MG/1
650 TABLET ORAL EVERY 4 HOURS PRN
Status: DISCONTINUED | OUTPATIENT
Start: 2023-10-28 | End: 2023-10-29 | Stop reason: HOSPADM

## 2023-10-28 RX ORDER — BISACODYL 10 MG
10 SUPPOSITORY, RECTAL RECTAL DAILY PRN
Status: DISCONTINUED | OUTPATIENT
Start: 2023-10-28 | End: 2023-10-29 | Stop reason: HOSPADM

## 2023-10-28 RX ORDER — POLYETHYLENE GLYCOL 3350 17 G/17G
17 POWDER, FOR SOLUTION ORAL DAILY PRN
Status: DISCONTINUED | OUTPATIENT
Start: 2023-10-28 | End: 2023-10-29 | Stop reason: HOSPADM

## 2023-10-28 RX ORDER — SODIUM CHLORIDE 0.9 % (FLUSH) 0.9 %
10 SYRINGE (ML) INJECTION EVERY 12 HOURS SCHEDULED
Status: DISCONTINUED | OUTPATIENT
Start: 2023-10-28 | End: 2023-10-29 | Stop reason: HOSPADM

## 2023-10-28 RX ORDER — NITROGLYCERIN 0.4 MG/1
0.4 TABLET SUBLINGUAL
Status: DISCONTINUED | OUTPATIENT
Start: 2023-10-28 | End: 2023-10-29 | Stop reason: HOSPADM

## 2023-10-28 RX ADMIN — SODIUM CHLORIDE 5 MG/HR: 9 INJECTION, SOLUTION INTRAVENOUS at 04:16

## 2023-10-28 RX ADMIN — CARVEDILOL 3.12 MG: 3.12 TABLET, FILM COATED ORAL at 09:34

## 2023-10-28 RX ADMIN — LOSARTAN POTASSIUM 50 MG: 50 TABLET, FILM COATED ORAL at 08:07

## 2023-10-28 RX ADMIN — POTASSIUM CHLORIDE 10 MEQ: 10 CAPSULE, COATED, EXTENDED RELEASE ORAL at 08:07

## 2023-10-28 RX ADMIN — Medication 10 ML: at 20:34

## 2023-10-28 RX ADMIN — Medication 1 APPLICATION: at 08:27

## 2023-10-28 RX ADMIN — NITROGLYCERIN 0.4 MG: 0.4 TABLET SUBLINGUAL at 02:35

## 2023-10-28 RX ADMIN — HYDRALAZINE HYDROCHLORIDE 10 MG: 20 INJECTION INTRAMUSCULAR; INTRAVENOUS at 04:00

## 2023-10-28 RX ADMIN — ACETAMINOPHEN 650 MG: 325 TABLET, FILM COATED ORAL at 20:27

## 2023-10-28 RX ADMIN — NITROGLYCERIN 0.4 MG: 0.4 TABLET SUBLINGUAL at 02:43

## 2023-10-28 RX ADMIN — HYDRALAZINE HYDROCHLORIDE 10 MG: 20 INJECTION INTRAMUSCULAR; INTRAVENOUS at 03:32

## 2023-10-28 RX ADMIN — SODIUM CHLORIDE 5 MG/HR: 9 INJECTION, SOLUTION INTRAVENOUS at 08:27

## 2023-10-28 RX ADMIN — Medication 10 ML: at 08:07

## 2023-10-28 RX ADMIN — IOPAMIDOL 100 ML: 755 INJECTION, SOLUTION INTRAVENOUS at 03:04

## 2023-10-28 RX ADMIN — CHLORHEXIDINE GLUCONATE 1 APPLICATION: 500 CLOTH TOPICAL at 05:49

## 2023-10-28 RX ADMIN — CARVEDILOL 3.12 MG: 3.12 TABLET, FILM COATED ORAL at 17:05

## 2023-10-28 NOTE — PROGRESS NOTES
Patient admitted after midnight by Dr. Sahu for hypertensive urgency.  Patient reportedly had chest pressure, hypokalemia and mild hyponatremia.  Patient received the Cozaar and on Cardene drip.  2 sets of troponin negative  Patient been replaced with potassium.  Will repeat level  Blood pressure improved at 174/93, heart rate 118.  This is in comparison when patient had as high as 220/116.  We will work on optimizing blood pressure within the next 24 to 48 hours.  Wean off Cardene as tolerated and if able to manage this, I anticipate that patient can be moved later to a regular floor.  Head CT scan no acute process.  Partially empty appearance of sella turcica may be a normal variant.  CT angiogram chest no evidence of thoracic aneurysm or dissection.  No visible pulmonary embolism.  Cardiomegaly and thickening of left ventricular myocardium.  Probable fatty liver  Chest x-ray no active disease  Pending laboratory include A1c, lipid panel-checking for modifiable risk factor for cardiovascular disease.  For risk stratification I will order for an echocardiogram-hypertensive cardiovascular disease    We do not have any information from pharmacist  or home meds patient reportedly was on amlodipine and Toprol-XL at some point early part of 2020 or latter part of 2019.      [START ON 10/29/2023] Chlorhexidine Gluconate Cloth, 1 application , Topical, Q24H  losartan, 50 mg, Oral, Q24H  mupirocin, 1 application , Each Nare, BID  potassium chloride, 10 mEq, Oral, Daily  senna-docusate sodium, 2 tablet, Oral, BID  sodium chloride, 10 mL, Intravenous, Q12H      I will start the patient on low-dose carvedilol.  Wean off Cardene drip.  Discussed with patient and nurse.  Time spent at least greater than 15 minutes review of record and ordering staff as well as interview of patient    Patient no longer has headache nor has chest pressure.  Symptoms she came in with  Telemetry showed sinus tachycardia  Normal respiratory  effort  On Cardene drip at 5 mg/h

## 2023-10-28 NOTE — PLAN OF CARE
Problem: Hypertension Acute  Goal: Blood Pressure Within Desired Range  Outcome: Ongoing, Progressing   Goal Outcome Evaluation:

## 2023-10-28 NOTE — H&P
[facility] Medicine Services  HISTORY AND PHYSICAL    Date of Admission: 10/28/2023  Primary Care Physician: Provider, No Known    Subjective   Primary Historian: Patient    Chief Complaint: Elevated blood pressure    Chest Pain   Pertinent negatives include no cough, fever or shortness of breath.   48-year-old female presents emergency department with complaints of feeling bad.  She states it started earlier tonight.  She had chest tightness when she was up with shortness of breath.  She states it was not quite a pain but is uncertain how to describe the feeling in her chest.  She did vomit prior to presentation, but has had no abdominal pain or diarrhea.  She states she earlier, she woke up with a headache.  She did not have any on my exam.  She has no lower extremity edema.  She has no acute bowel or kidney dysfunction.        Review of Systems   Constitutional:  Negative for chills and fever.   Respiratory:  Negative for cough and shortness of breath.    Cardiovascular:  Positive for chest pain. Negative for leg swelling.   Gastrointestinal:  Negative for constipation and diarrhea.   Genitourinary:  Negative for dysuria and frequency.      Otherwise complete ROS reviewed and negative except as mentioned in the HPI.    Past Medical History:   Past Medical History:   Diagnosis Date    Dizziness     Dyslipidemia     Hypertension     Hypokalemia     Palpitations     Periapical abscess     Rita Parkinson White pattern seen on electrocardiogram      Past Surgical History:History reviewed. No pertinent surgical history.  Social History:  reports that she has never smoked. She does not have any smokeless tobacco history on file. She reports current alcohol use. She reports that she does not use drugs.    Family History: HTN    Allergies:  No Known Allergies    Medications:  Prior to Admission medications    Not on File     I have utilized all available immediate resources to obtain,  "update, or review the patient's current medications (including all prescriptions, over-the-counter products, herbals, cannabis/cannabidiol products, and vitamin/mineral/dietary (nutritional) supplements).    Objective     Vital Signs: /79   Pulse (!) 124   Temp 98.9 °F (37.2 °C) (Temporal)   Resp 14   Ht 167.6 cm (66\")   Wt 93.2 kg (205 lb 8 oz)   SpO2 99%   BMI 33.17 kg/m²   Physical Exam  Vitals reviewed.   Constitutional:       Appearance: Normal appearance.   HENT:      Head: Normocephalic and atraumatic.      Right Ear: External ear normal.      Left Ear: External ear normal.      Nose: Nose normal.   Eyes:      General: No scleral icterus.     Conjunctiva/sclera: Conjunctivae normal.   Cardiovascular:      Rate and Rhythm: Normal rate and regular rhythm.      Heart sounds: Normal heart sounds.   Pulmonary:      Effort: Pulmonary effort is normal.      Breath sounds: Normal breath sounds.   Abdominal:      General: Bowel sounds are normal.      Palpations: Abdomen is soft.   Musculoskeletal:         General: No swelling or tenderness.      Cervical back: Normal range of motion and neck supple.   Skin:     General: Skin is warm and dry.   Neurological:      Mental Status: She is alert and oriented to person, place, and time.      Cranial Nerves: No cranial nerve deficit.   Psychiatric:         Mood and Affect: Mood normal.         Behavior: Behavior normal.          Results Reviewed:  Lab Results (last 24 hours)       Procedure Component Value Units Date/Time    High Sensitivity Troponin T 2Hr [[id number]]  (Normal) Collected: 10/28/23 0412    Specimen: Blood Updated: 10/28/23 0448     HS Troponin T 9 ng/L      Troponin T Delta 2 ng/L     Narrative:      High Sensitive Troponin T Reference Range:  <10.0 ng/L- Negative Female for AMI  <15.0 ng/L- Negative Male for AMI  >=10 - Abnormal Female indicating possible myocardial injury.  >=15 - Abnormal Male indicating possible myocardial injury. "   Clinicians would have to utilize clinical acumen, EKG, Troponin, and serial changes to determine if it is an Acute Myocardial Infarction or myocardial injury due to an underlying chronic condition.         Basic Metabolic Panel [[id number]]  (Abnormal) Collected: 10/28/23 0232    Specimen: Blood Updated: 10/28/23 0257     Glucose 136 mg/dL      BUN 16 mg/dL      Creatinine 0.71 mg/dL      Sodium 135 mmol/L      Potassium 3.3 mmol/L      Comment: Slight hemolysis detected by analyzer. Results may be affected.        Chloride 99 mmol/L      CO2 23.0 mmol/L      Calcium 9.0 mg/dL      BUN/Creatinine Ratio 22.5     Anion Gap 13.0 mmol/L      eGFR 106.3 mL/min/1.73     Narrative:      GFR Normal >60  Chronic Kidney Disease <60  Kidney Failure <15      High Sensitivity Troponin T [[id number]]  (Normal) Collected: 10/28/23 0232    Specimen: Blood Updated: 10/28/23 0255     HS Troponin T 7 ng/L     Narrative:      High Sensitive Troponin T Reference Range:  <10.0 ng/L- Negative Female for AMI  <15.0 ng/L- Negative Male for AMI  >=10 - Abnormal Female indicating possible myocardial injury.  >=15 - Abnormal Male indicating possible myocardial injury.   Clinicians would have to utilize clinical acumen, EKG, Troponin, and serial changes to determine if it is an Acute Myocardial Infarction or myocardial injury due to an underlying chronic condition.         CBC & Differential [[id number]]  (Abnormal) Collected: 10/28/23 0232    Specimen: Blood Updated: 10/28/23 0238    Narrative:      The following orders were created for panel order CBC & Differential.  Procedure                               Abnormality         Status                     ---------                               -----------         ------                     CBC Auto Differential[[id number]]        Abnormal            Final result                 Please view results for these tests on the individual orders.    CBC Auto Differential [[id number]]  (Abnormal)  Collected: 10/28/23 0232    Specimen: Blood Updated: 10/28/23 0238     WBC 7.05 10*3/mm3      RBC 4.73 10*6/mm3      Hemoglobin 11.8 g/dL      Hematocrit 38.6 %      MCV 81.6 fL      MCH 24.9 pg      MCHC 30.6 g/dL      RDW 14.0 %      RDW-SD 41.3 fl      MPV 10.0 fL      Platelets 336 10*3/mm3      Neutrophil % 52.1 %      Lymphocyte % 40.4 %      Monocyte % 5.1 %      Eosinophil % 1.7 %      Basophil % 0.4 %      Immature Grans % 0.3 %      Neutrophils, Absolute 3.67 10*3/mm3      Lymphocytes, Absolute 2.85 10*3/mm3      Monocytes, Absolute 0.36 10*3/mm3      Eosinophils, Absolute 0.12 10*3/mm3      Basophils, Absolute 0.03 10*3/mm3      Immature Grans, Absolute 0.02 10*3/mm3      nRBC 0.0 /100 WBC           Imaging Results (Last 24 Hours)       Procedure Component Value Units Date/Time    CT Head Without Contrast [[id number]] Resulted: 10/28/23 0256     Updated: 10/28/23 0305    CT Angiogram Chest [[id number]] Resulted: 10/28/23 0256     Updated: 10/28/23 0305    XR Chest 1 View [[id number]] Resulted: 10/28/23 0230     Updated: 10/28/23 0230          I have personally reviewed and interpreted the radiology studies and ECG obtained at time of admission.     Assessment / Plan   Assessment:   Active Hospital Problems    Diagnosis     **Hypertensive urgency      Impression:  Hypertensive urgency  Chest pressure  Hypokalemia  Mild hyponatremia    Treatment Plan  ICU admission  Continue Cardene drip  Add Cozaar  EKG and troponin in the morning  Start to 10 mEq of potassium daily  Follow-up labs in the morning    The patient will be admitted to my service here at [facility].  Primary team to take over the morning    Medical Decision Making  Number and Complexity of problems: 4, complex  Differential Diagnosis: Cardiac ischemia    Conditions and Status        Condition is unchanged.     [facility] Data  External documents reviewed: None  Cardiac tracing (EKG, telemetry) interpretation: None   radiology interpretation:  None  Labs reviewed: Reviewed  Any tests that were considered but not ordered: None     Decision rules/scores evaluated (example [id number], Wells, etc): None     Discussed with: Patient     Care Planning  Shared decision making: Patient and ED staff  Code status and discussions: Full    Disposition  Social Determinants of Health that impact treatment or disposition: None  Estimated length of stay is overnight.     I confirmed that the patient's advanced care plan is present, code status is documented, and a surrogate decision maker is listed in the patient's medical record.     The patient's surrogate decision maker is  family  The patient was seen and examined by me on 10/28/2023 at 5.    Electronically signed by Monica Murillo DO, 10/28/23, 05:10 CDT.

## 2023-10-28 NOTE — ED PROVIDER NOTES
Subjective   History of Present Illness  Patient states that she has been having some chest tightness for the past few hours.  States that she woke up with this.  States that she is supposed to take blood pressure medication but has not since COVID started as she stopped seeing her primary care provider.  States that currently she has no numbness or tingling.  States that she has a mild headache.  States that the pain in her chest did not radiate to her arms.  Denies any abdominal pain or dysuria or hematuria hematochezia.  Denies any saddle paresthesias or lower extremity numbness or tingling.  Has not tried anything for her symptoms prior to arrival      Review of Systems   All other systems reviewed and are negative.      Past Medical History:   Diagnosis Date    Dizziness     Dyslipidemia     Elevated cholesterol     Hypertension     Hypokalemia     Palpitations     Periapical abscess     Rita Parkinson White pattern seen on electrocardiogram        No Known Allergies    History reviewed. No pertinent surgical history.    History reviewed. No pertinent family history.    Social History     Socioeconomic History    Marital status: Single   Tobacco Use    Smoking status: Never   Vaping Use    Vaping Use: Never used   Substance and Sexual Activity    Alcohol use: Yes     Comment: occ    Drug use: Never    Sexual activity: Defer           Objective   Physical Exam  Vitals and nursing note reviewed.   Constitutional:       General: She is in acute distress (due to chest pain and hypertension).      Appearance: Normal appearance. She is not toxic-appearing or diaphoretic.   HENT:      Head: Normocephalic and atraumatic.      Right Ear: External ear normal.      Left Ear: External ear normal.      Nose: Nose normal.      Mouth/Throat:      Mouth: Mucous membranes are moist.   Eyes:      General:         Right eye: No discharge.         Left eye: No discharge.      Extraocular Movements: Extraocular movements intact.       Conjunctiva/sclera: Conjunctivae normal.   Cardiovascular:      Rate and Rhythm: Normal rate.      Pulses: Normal pulses.   Pulmonary:      Effort: Pulmonary effort is normal. No respiratory distress.      Breath sounds: No rhonchi.   Abdominal:      General: Abdomen is flat.      Tenderness: There is no abdominal tenderness. There is no guarding or rebound.   Musculoskeletal:         General: No deformity or signs of injury.   Skin:     General: Skin is warm.      Capillary Refill: Capillary refill takes less than 2 seconds.      Coloration: Skin is not jaundiced.   Neurological:      Mental Status: She is alert and oriented to person, place, and time. Mental status is at baseline.   Psychiatric:         Mood and Affect: Mood normal.         Behavior: Behavior normal.         Thought Content: Thought content normal.         Judgment: Judgment normal.         Procedures           ED Course                                           Medical Decision Making  Regi Jesus is a very pleasant 48 y.o. female who presents to the ED for chest pain and hypertension.     Patient was non-toxic and not-ill appearing on arrival.     Vital signs stable although she is very hypertensive.     Patient's presentation raises suspicion for differentials including, but not limited to, dissection, PE, ACS, anxiety, uncontrolled HTN.     External (non-ED) record review: none    Given this, Regi was placed on the monitor. Laboratory studies and imaging studies were ordered including cbc, cmp, EKG, troponin x 2, CTA chest, CT head.     Regi was given aspirin, nitroglycerin and hydralazine. Her blood pressure is still elevated after these interventions and so she was started on a cardene drip for further management.    EKG was reviewed and found to have evidence of LA enlargement and other non-specific t-wave changes. No obvious STEMI.    I reassessed the patient and discussed the findings of the work up so far. I said that the next  step in treatment would be admission to the hospital for further workup and care. I also said that there is always some diagnostic uncertainty in the ER, that symptoms may change, and new things may be found after being admitted.     The hospitalist service was consulted for evaluation and admission. The hospitalist service assumed primary care of the patient and admitted the patient in stable condition.      Signed by:   Marybeth Pearson MD 10/28/2023 04:48 CDT   Emergency Medicine Physician    Dragon disclaimer:  Part of this note may be an electronic transcription/translation of spoken language to printed text using the Dragon Dictation System.         Problems Addressed:  Hypertensive urgency: complicated acute illness or injury    Amount and/or Complexity of Data Reviewed  Labs: ordered.  Radiology: ordered.    Risk  Prescription drug management.  Decision regarding hospitalization.        Final diagnoses:   Hypertensive urgency       ED Disposition  ED Disposition       ED Disposition   Decision to Admit    Condition   --    Comment   Level of Care: Critical Care [6]   Diagnosis: Hypertensive urgency [520445]   Certification: I Certify That Inpatient Hospital Services Are Medically Necessary For Greater Than 2 Midnights                 No follow-up provider specified.       Medication List      No changes were made to your prescriptions during this visit.            Marybeth Pearson MD  10/29/23 0240

## 2023-10-29 VITALS
WEIGHT: 202 LBS | RESPIRATION RATE: 16 BRPM | TEMPERATURE: 98.4 F | OXYGEN SATURATION: 98 % | HEART RATE: 81 BPM | HEIGHT: 68 IN | BODY MASS INDEX: 30.62 KG/M2 | DIASTOLIC BLOOD PRESSURE: 88 MMHG | SYSTOLIC BLOOD PRESSURE: 144 MMHG

## 2023-10-29 LAB
ANION GAP SERPL CALCULATED.3IONS-SCNC: 11 MMOL/L (ref 5–15)
BUN SERPL-MCNC: 17 MG/DL (ref 6–20)
BUN/CREAT SERPL: 24.3 (ref 7–25)
CALCIUM SPEC-SCNC: 8.6 MG/DL (ref 8.6–10.5)
CHLORIDE SERPL-SCNC: 102 MMOL/L (ref 98–107)
CO2 SERPL-SCNC: 24 MMOL/L (ref 22–29)
CREAT SERPL-MCNC: 0.7 MG/DL (ref 0.57–1)
EGFRCR SERPLBLD CKD-EPI 2021: 108.2 ML/MIN/1.73
GLUCOSE SERPL-MCNC: 105 MG/DL (ref 65–99)
POTASSIUM SERPL-SCNC: 3.3 MMOL/L (ref 3.5–5.2)
SODIUM SERPL-SCNC: 137 MMOL/L (ref 136–145)

## 2023-10-29 PROCEDURE — 80048 BASIC METABOLIC PNL TOTAL CA: CPT | Performed by: INTERNAL MEDICINE

## 2023-10-29 PROCEDURE — 93005 ELECTROCARDIOGRAM TRACING: CPT | Performed by: INTERNAL MEDICINE

## 2023-10-29 PROCEDURE — 93010 ELECTROCARDIOGRAM REPORT: CPT | Performed by: INTERNAL MEDICINE

## 2023-10-29 RX ORDER — CARVEDILOL 3.12 MG/1
3.12 TABLET ORAL 2 TIMES DAILY WITH MEALS
Qty: 60 TABLET | Refills: 0 | Status: SHIPPED | OUTPATIENT
Start: 2023-10-29 | End: 2023-11-03 | Stop reason: SDUPTHER

## 2023-10-29 RX ORDER — POTASSIUM CHLORIDE 750 MG/1
40 CAPSULE, EXTENDED RELEASE ORAL ONCE
Status: COMPLETED | OUTPATIENT
Start: 2023-10-29 | End: 2023-10-29

## 2023-10-29 RX ORDER — LOSARTAN POTASSIUM 50 MG/1
50 TABLET ORAL
Qty: 30 TABLET | Refills: 0 | Status: SHIPPED | OUTPATIENT
Start: 2023-10-30 | End: 2023-11-03 | Stop reason: SDUPTHER

## 2023-10-29 RX ORDER — LOSARTAN POTASSIUM 50 MG/1
50 TABLET ORAL
Qty: 30 TABLET | Refills: 0 | Status: SHIPPED | OUTPATIENT
Start: 2023-10-30 | End: 2023-10-29 | Stop reason: SDUPTHER

## 2023-10-29 RX ORDER — ACETAMINOPHEN 325 MG/1
650 TABLET ORAL EVERY 6 HOURS PRN
COMMUNITY

## 2023-10-29 RX ORDER — CARVEDILOL 3.12 MG/1
3.12 TABLET ORAL 2 TIMES DAILY WITH MEALS
Qty: 60 TABLET | Refills: 0 | Status: SHIPPED | OUTPATIENT
Start: 2023-10-29 | End: 2023-10-29 | Stop reason: SDUPTHER

## 2023-10-29 RX ADMIN — CARVEDILOL 3.12 MG: 3.12 TABLET, FILM COATED ORAL at 09:10

## 2023-10-29 RX ADMIN — POTASSIUM CHLORIDE 40 MEQ: 750 CAPSULE, EXTENDED RELEASE ORAL at 11:03

## 2023-10-29 RX ADMIN — POTASSIUM CHLORIDE 10 MEQ: 10 CAPSULE, COATED, EXTENDED RELEASE ORAL at 09:10

## 2023-10-29 RX ADMIN — Medication 10 ML: at 09:11

## 2023-10-29 RX ADMIN — LOSARTAN POTASSIUM 50 MG: 50 TABLET, FILM COATED ORAL at 09:10

## 2023-10-29 NOTE — PROGRESS NOTES
[facility] Medicine Services  DISCHARGE SUMMARY       Date of Admission: 10/28/2023  Date of Discharge:  10/29/2023  Primary Care Physician: Provider, No Known    Presenting Problem/History of Present Illness:  Elevated blood pressure    Final Discharge Diagnoses:  Hypertensive urgency  Hypertensive cardiovascular disease  Hypokalemia resolved  Chest tightness in the setting of hypertensive emergency with negative troponins.  Noted abnormality in EKG twave changes.  No wall motion abnormalities on echocardiogram.  If any persistence of the chest redness or recurrence, can consider outpatient stress test.  Consults: None    Procedures Performed: None    Pertinent Test Results:   Results for orders placed during the hospital encounter of 10/28/23    Adult Transthoracic Echo Complete W/ Cont if Necessary Per Protocol    Interpretation Summary    Left ventricular systolic function is normal. Left ventricular ejection fraction appears to be 61 - 65%.    Left ventricular wall thickness is consistent with moderate concentric hypertrophy.    Normal right ventricular cavity size and systolic function noted.    No significant valvular abnormalities identified on this study.      Imaging Results (All)       Procedure Component Value Units Date/Time    CT Angiogram Chest [[id number]] Collected: 10/28/23 0841     Updated: 10/28/23 0849    Narrative:      EXAMINATION:  CT ANGIOGRAM CHEST-  10/28/2023 2:55 AM CDT     HISTORY: Hypertension. Chest pain radiating to back. Headache.     COMPARISON : No comparison study.     DLP: 390 mGy-cm. Automated dosage reduction technique was utilized to  decrease patient dosage.     TECHNIQUE: CT angio was performed of the chest with IV contrast.  Coronal, sagittal and 3-D reconstruction were performed.     INDEPENDENT 3-D WORKSTATION UTILIZED FOR RECONSTRUCTION: Yes. A  radiologist was not present in the department.     MEDIASTINUM, HEART AND VASCULAR  STRUCTURES: The thoracic aorta is normal  in caliber. No evidence of thoracic aortic aneurysm or dissection. The  pulmonary arteries are normal in caliber. There are no visible pulmonary  emboli. There is no lymphadenopathy. There is no pericardial effusion.  There is cardiomegaly with thickening of the left ventricular  myocardium. There is a small amount of residual thymus tissue in the  anterior mediastinum.     LUNGS: There is minimal dependent atelectasis. There is no dense  consolidation or pleural effusion.     UPPER ABDOMEN: There may be fatty liver. Liver size is upper limits of  normal.     BONES: There are degenerative changes of the visualized spine. There is  ossification of the posterior longitudinal ligament in the thoracic  spine.          Impression:      1. No evidence of thoracic aortic aneurysm or dissection. No visible  pulmonary embolus. There is cardiomegaly and thickening of the left  ventricular myocardium.  2. Minimal dependent atelectasis.  3. Probable fatty liver. Liver size upper limits of normal. The liver  measures 19 cm cephalocaudal.       This report was signed and finalized on 10/28/2023 8:46 AM CDT by Dr. Kranthi Hutchinson MD.       CT Head Without Contrast [[id number]] Collected: 10/28/23 0838     Updated: 10/28/23 0844    Narrative:      EXAMINATION:  CT HEAD WO CONTRAST-  10/28/2023 2:55 AM CDT     HISTORY: Headache. Hypertension.     TECHNIQUE: Multiple axial images were obtained through the brain without  contrast infusion. Multiplanar images were reconstructed.     DLP: 679 mGy-cm. Automated dosage reduction technique was utilized to  reduce patient dosage.     COMPARISON: No comparison study.     FINDINGS: There are no hemorrhage, edema or mass effect. There is a  partially empty appearance of the sella turcica. The ventricular system  is nondilated. The visualized paranasal sinuses are clear. The mastoid  air cells are clear. No calvarial fracture is seen.           Impression:      1. No hemorrhage, edema or mass effect.  2. Partially empty appearance of the sella turcica may be a normal  variant. It can also be associated with intracranial hypertension.        This report was signed and finalized on 10/28/2023 8:41 AM CDT by Dr. Kranthi Hutchinson MD.       XR Chest 1 View [[id number]] Collected: 10/28/23 0837     Updated: 10/28/23 0841    Narrative:      EXAMINATION:  XR CHEST 1 VW-  10/28/2023 2:25 AM CDT     HISTORY: Chest pain.     COMPARISON: No comparison study.     TECHNIQUE: Single view AP image.     FINDINGS:  The lungs are expanded bilaterally and clear. The pleural  spaces are clear. Heart size is upper limits of normal. There is  narrowing and spurring of the right AC joint. There are degenerative  changes of the spine. No acute bony abnormality is seen.          Impression:      No active disease is seen.           This report was signed and finalized on 10/28/2023 8:38 AM CDT by Dr. Kranthi Hutchinson MD.             LAB RESULTS:      Lab 10/28/23  0904 10/28/23  0232   WBC 8.15 7.05   HEMOGLOBIN 12.1 11.8*   HEMATOCRIT 39.7 38.6   PLATELETS 361 336   NEUTROS ABS 6.33 3.67   IMMATURE GRANS (ABS) 0.03 0.02   LYMPHS ABS 1.49 2.85   MONOS ABS 0.28 0.36   EOS ABS 0.00 0.12   MCV 80.9 81.6         Lab 10/29/23  0409 10/28/23  0904 10/28/23  0232   SODIUM 137 137 135*   POTASSIUM 3.3* 3.5 3.3*   CHLORIDE 102 102 99   CO2 24.0 22.0 23.0   ANION GAP 11.0 13.0 13.0   BUN 17 12 16   CREATININE 0.70 0.53* 0.71   EGFR 108.2 115.7 106.3   GLUCOSE 105* 120* 136*   CALCIUM 8.6 9.0 9.0   HEMOGLOBIN A1C  --  6.00*  --          Lab 10/28/23  0904   TOTAL PROTEIN 8.1   ALBUMIN 4.0   GLOBULIN 4.1   ALT (SGPT) 11   AST (SGOT) 15   BILIRUBIN 0.2   ALK PHOS 90         Lab 10/28/23  0412 10/28/23  0232   HSTROP T 9 7         Lab 10/28/23  0904   CHOLESTEROL 190   LDL CHOL 136*   HDL CHOL 41   TRIGLYCERIDES 72             Brief Urine Lab Results       None          Microbiology Results (last  "10 days)       ** No results found for the last 240 hours. **            Hospital Course:   She is a 48-year-old woman admitted on October 28 by Dr. Sahu for hypertensive urgency.  Patient reportedly had chest pressure.  Her provisional diagnosis includes hypertensive urgency, hypokalemia and mild hyponatremia.  She initially had Cardene drip.  She was transitioned to oral antihypertensive medications.  BP better controlled.  Further follow-up and adjustment of medication care off primary care provider.    I am giving her a one-time dose of potassium 40 mEq for hypokalemia (3.3).  I do not have any particular reason why she would lose potassium.  Defer to primary care provider rechecking.    I will asked the nurse to give her less of primary care provider who she can set an appointment with.  We are limited because offices are closed on Sunday.  I strongly encouraged her to monitor her blood pressure and bring record to her primary care provider on current regimen of losartan and carvedilol.  I told her that medication can be adjusted to better control BP and prevent consequences of prolonged untreated hypertension.    Patient expressed that she is ready to go home.  All questions were answered to the best of my abilities.      Physical Exam on Discharge:  /88 (BP Location: Right arm, Patient Position: Lying)   Pulse 81   Temp 98.4 °F (36.9 °C) (Oral)   Resp 16   Ht 172.7 cm (68\")   Wt 91.6 kg (202 lb)   SpO2 98%   BMI 30.71 kg/m²   Physical Exam  GEN: Awake, alert, interactive, in NAD  HEENT: Atraumatic, PERRLA, EOMI, Anicteric, Trachea midline  Lungs: CTAB, no wheezing/rales/rhonchi  Heart: RRR, +S1/s2, no rub  ABD: soft, nt/nd, +BS, no guarding/rebound  Extremities: atraumatic, no cyanosis, no edema  Skin: no rashes or lesions  Neuro: AAOx3, no focal deficits  Psych: normal mood & affect      Condition on Discharge: Stable    Discharge Disposition:  Home or Self Care    Discharge Medications:   "   Discharge Medications        New Medications        Instructions Start Date   carvedilol 3.125 MG tablet  Commonly known as: COREG   3.125 mg, Oral, 2 Times Daily With Meals      losartan 50 MG tablet  Commonly known as: COZAAR   50 mg, Oral, Every 24 Hours Scheduled   Start Date: October 30, 2023            Continue These Medications        Instructions Start Date   acetaminophen 325 MG tablet  Commonly known as: TYLENOL   650 mg, Oral, Every 6 Hours PRN                   Discharge Diet:   Diet Instructions       Diet: Cardiac Diets; Healthy Heart (2-3 Na+); Thin (IDDSI 0)      Discharge Diet: Cardiac Diets    Cardiac Diet: Healthy Heart (2-3 Na+)    Fluid Consistency: Thin (IDDSI 0)            Activity at Discharge:   Activity Instructions       Gradually Increase Activity Until at Pre-Hospitalization Level              Follow-up Appointments:   PCP of choice within 1 week    Test Results Pending at Discharge: none    Electronically signed by Isaías Charles MD, 10/29/23, 10:00 CDT.    Time: > 30  minutes.

## 2023-10-30 ENCOUNTER — PATIENT OUTREACH (OUTPATIENT)
Dept: CASE MANAGEMENT | Facility: OTHER | Age: 48
End: 2023-10-30
Payer: COMMERCIAL

## 2023-10-30 LAB
QT INTERVAL: 354 MS
QT INTERVAL: 418 MS
QTC INTERVAL: 467 MS
QTC INTERVAL: 488 MS

## 2023-10-30 NOTE — PAYOR COMM NOTE
"10/30/23 [facility] [phone]  [phone]      ER ADMIT TO INPATIENT ON 10/28/23. INPATIENT ORDER.    FAXING FOR INPATIENT REVIEW.            Ck Foster (48 y.o. Female)       Date of Birth   1975    Social Security Number       Address   [address] [place] [address]  [place] [address]    Home Phone   [phone]    MRN   [id number]       [facility]    Marital Status   Single                            Admission Date   10/28/23    Admission Type   Emergency    Admitting Provider   Isaías Charles MD    Attending Provider       Department, Room/Bed   [address], 463/1       Discharge Date   10/29/2023    Discharge Disposition   Home or Self Care    Discharge Destination                                 Attending Provider: (none)   Allergies: No Known Allergies    Isolation: None   Infection: None   Code Status: Prior    Ht: 172.7 cm (68\")   Wt: 91.6 kg (202 lb)    Admission Cmt: None   Principal Problem: Hypertensive urgency [I16.0]                   Active Insurance as of 10/28/2023       Primary Coverage       Payor Plan Insurance Group Employer/Plan Group    [employer] EMPLOYEE [id number]       Payor Plan Address Payor Plan Phone Number Payor Plan Fax Number Effective Dates     [id number] [phone]  1/1/2022 - None Entered    [place] [address]         Subscriber Name Subscriber Birth Date Member ID       CK FOSTER 1975 [id number]                     Emergency Contacts        (Rel.) Home Phone Work Phone Mobile Phone    obie Foster (Brother) [phone] -- [phone]             [facility] Encounter Date/Time: 10/28/2023 [facility]   Hospital Account: [id number]    MRN: [id number]   Patient:  Ck Foster   Contact Serial #: [id number]   SSN:          ENCOUNTER             Patient Class: Inpatient   Unit: [address] Service: Medicine     Bed: 463/1   Admitting " Provider: Isaías Charles*   Referring Physician: Monica Murillo   Attending Provider:     Adm Diagnosis: Hypertensive urgency [I1*               PATIENT          Name: Ck Foster : 1975 (48 yrs)   Address: [address] Sex: Female   City: [place] [address]   County: [facility]   Marital Status: SINGLE Ethnicity: NOT                                                                              Race: BLACK   Primary Care Provider: Provider, No Known Patients Phone: Home Phone: [phone]     Mobile Phone: [phone]   EMERGENCY CONTACT   Contact Name Legal Guardian? Relationship to Patient Home Phone Work Phone   1. obie Foster  2. *No Contact Specified* No    Brother    [phone]              GUARANTOR            Guarantor: Ck Foster     : 1975   Address: [address] Sex: [place] [address]     Relation to Patient: Self       Home Phone: [phone]   Guarantor ID: [id number]       Work Phone:     GUARANTOR EMPLOYER   Employer: [employer]         Status: FULL TIME   COVERAGE          PRIMARY INSURANCE   Payor: DE[employer]RA [employer]UE CROSS Plan: [employer] EMPLOYEE   Group Number: [id number] Insurance Type: INDEMNITY   Subscriber Name: CK FOSTER Subscriber : 1975   Subscriber ID: [id number] Coverage Address: [facility] [id number]  [place] [address]   Pat. Rel. to Subscriber: Self Coverage Phone: [phone]   SECONDARY INSURANCE   Payor: N/A Plan: N/A   Group Number:   Insurance Type:     Subscriber Name:   Subscriber :     Subscriber ID:   Coverage Address:     Pat. Rel. to Subscriber:   Coverage Phone:        Contact Serial # ([id number])         2023    Chart ID ([id number])                 Monica Murillo DO   Physician  Medicine     H&P     Signed     Date of Service: 10/28/23 0510  Creation Time: 10/28/23 0510     Signed       Expand All Collapse All         [facility]  Hospital Medicine Services  HISTORY AND PHYSICAL     Date of Admission: 10/28/2023  Primary Care Physician: Provider, No Known     Subjective   Primary Historian: Patient     Chief Complaint: Elevated blood pressure     Chest Pain   Pertinent negatives include no cough, fever or shortness of breath.   48-year-old female presents emergency department with complaints of feeling bad.  She states it started earlier tonight.  She had chest tightness when she was up with shortness of breath.  She states it was not quite a pain but is uncertain how to describe the feeling in her chest.  She did vomit prior to presentation, but has had no abdominal pain or diarrhea.  She states she earlier, she woke up with a headache.  She did not have any on my exam.  She has no lower extremity edema.  She has no acute bowel or kidney dysfunction.           Review of Systems   Constitutional:  Negative for chills and fever.   Respiratory:  Negative for cough and shortness of breath.    Cardiovascular:  Positive for chest pain. Negative for leg swelling.   Gastrointestinal:  Negative for constipation and diarrhea.   Genitourinary:  Negative for dysuria and frequency.      Otherwise complete ROS reviewed and negative except as mentioned in the HPI.     Past Medical History:   Medical History[]Expand by Default        Past Medical History:   Diagnosis Date    Dizziness      Dyslipidemia      Hypertension      Hypokalemia      Palpitations      Periapical abscess      Rita Parkinson White pattern seen on electrocardiogram           Past Surgical History:  Surgical History   History reviewed. No pertinent surgical history.     Social History:  reports that she has never smoked. She does not have any smokeless tobacco history on file. She reports current alcohol use. She reports that she does not use drugs.     Family History: HTN     Allergies:  No Known Allergies     Medications:  Prior to Admission medications    Not on File      I have  "utilized all available immediate resources to obtain, update, or review the patient's current medications (including all prescriptions, over-the-counter products, herbals, cannabis/cannabidiol products, and vitamin/mineral/dietary (nutritional) supplements).     Objective      Vital Signs: /79   Pulse (!) 124   Temp 98.9 °F (37.2 °C) (Temporal)   Resp 14   Ht 167.6 cm (66\")   Wt 93.2 kg (205 lb 8 oz)   SpO2 99%   BMI 33.17 kg/m²   Physical Exam  Vitals reviewed.   Constitutional:       Appearance: Normal appearance.   HENT:      Head: Normocephalic and atraumatic.      Right Ear: External ear normal.      Left Ear: External ear normal.      Nose: Nose normal.   Eyes:      General: No scleral icterus.     Conjunctiva/sclera: Conjunctivae normal.   Cardiovascular:      Rate and Rhythm: Normal rate and regular rhythm.      Heart sounds: Normal heart sounds.   Pulmonary:      Effort: Pulmonary effort is normal.      Breath sounds: Normal breath sounds.   Abdominal:      General: Bowel sounds are normal.      Palpations: Abdomen is soft.   Musculoskeletal:         General: No swelling or tenderness.      Cervical back: Normal range of motion and neck supple.   Skin:     General: Skin is warm and dry.   Neurological:      Mental Status: She is alert and oriented to person, place, and time.      Cranial Nerves: No cranial nerve deficit.   Psychiatric:         Mood and Affect: Mood normal.         Behavior: Behavior normal.            Results Reviewed:  Lab Results (last 24 hours)         Procedure Component Value Units Date/Time     High Sensitivity Troponin T 2Hr [[id number]]  (Normal) Collected: 10/28/23 0412     Specimen: Blood Updated: 10/28/23 0448       HS Troponin T 9 ng/L         Troponin T Delta 2 ng/L       Narrative:       High Sensitive Troponin T Reference Range:  <10.0 ng/L- Negative Female for AMI  <15.0 ng/L- Negative Male for AMI  >=10 - Abnormal Female indicating possible myocardial " injury.  >=15 - Abnormal Male indicating possible myocardial injury.   Clinicians would have to utilize clinical acumen, EKG, Troponin, and serial changes to determine if it is an Acute Myocardial Infarction or myocardial injury due to an underlying chronic condition.           Basic Metabolic Panel [[id number]]  (Abnormal) Collected: 10/28/23 0232     Specimen: Blood Updated: 10/28/23 0257       Glucose 136 mg/dL         BUN 16 mg/dL         Creatinine 0.71 mg/dL         Sodium 135 mmol/L         Potassium 3.3 mmol/L         Comment: Slight hemolysis detected by analyzer. Results may be affected.          Chloride 99 mmol/L         CO2 23.0 mmol/L         Calcium 9.0 mg/dL         BUN/Creatinine Ratio 22.5       Anion Gap 13.0 mmol/L         eGFR 106.3 mL/min/1.73       Narrative:       GFR Normal >60  Chronic Kidney Disease <60  Kidney Failure <15        High Sensitivity Troponin T [[id number]]  (Normal) Collected: 10/28/23 0232     Specimen: Blood Updated: 10/28/23 0255       HS Troponin T 7 ng/L       Narrative:       High Sensitive Troponin T Reference Range:  <10.0 ng/L- Negative Female for AMI  <15.0 ng/L- Negative Male for AMI  >=10 - Abnormal Female indicating possible myocardial injury.  >=15 - Abnormal Male indicating possible myocardial injury.   Clinicians would have to utilize clinical acumen, EKG, Troponin, and serial changes to determine if it is an Acute Myocardial Infarction or myocardial injury due to an underlying chronic condition.           CBC & Differential [[id number]]  (Abnormal) Collected: 10/28/23 0232     Specimen: Blood Updated: 10/28/23 0238     Narrative:       The following orders were created for panel order CBC & Differential.  Procedure                               Abnormality         Status                     ---------                               -----------         ------                     CBC Auto Differential[[id number]]        Abnormal            Final result                   Please view results for these tests on the individual orders.     CBC Auto Differential [[id number]]  (Abnormal) Collected: 10/28/23 0232     Specimen: Blood Updated: 10/28/23 0238       WBC 7.05 10*3/mm3         RBC 4.73 10*6/mm3         Hemoglobin 11.8 g/dL         Hematocrit 38.6 %         MCV 81.6 fL         MCH 24.9 pg         MCHC 30.6 g/dL         RDW 14.0 %         RDW-SD 41.3 fl         MPV 10.0 fL         Platelets 336 10*3/mm3         Neutrophil % 52.1 %         Lymphocyte % 40.4 %         Monocyte % 5.1 %         Eosinophil % 1.7 %         Basophil % 0.4 %         Immature Grans % 0.3 %         Neutrophils, Absolute 3.67 10*3/mm3         Lymphocytes, Absolute 2.85 10*3/mm3         Monocytes, Absolute 0.36 10*3/mm3         Eosinophils, Absolute 0.12 10*3/mm3         Basophils, Absolute 0.03 10*3/mm3         Immature Grans, Absolute 0.02 10*3/mm3         nRBC 0.0 /100 WBC               Imaging Results (Last 24 Hours)         Procedure Component Value Units Date/Time     CT Head Without Contrast [[id number]] Resulted: 10/28/23 0256       Updated: 10/28/23 0305     CT Angiogram Chest [[id number]] Resulted: 10/28/23 0256       Updated: 10/28/23 0305     XR Chest 1 View [[id number]] Resulted: 10/28/23 0230       Updated: 10/28/23 0230             I have personally reviewed and interpreted the radiology studies and ECG obtained at time of admission.      Assessment / Plan   Assessment:        Active Hospital Problems     Diagnosis      **Hypertensive urgency        Impression:  Hypertensive urgency  Chest pressure  Hypokalemia  Mild hyponatremia     Treatment Plan  ICU admission  Continue Cardene drip  Add Cozaar  EKG and troponin in the morning  Start to 10 mEq of potassium daily  Follow-up labs in the morning     The patient will be admitted to my service here at [facility].  Primary team to take over the morning     Medical Decision Making  Number and Complexity of problems: 4,  complex  Differential Diagnosis: Cardiac ischemia     Conditions and Status        Condition is unchanged.     [facility]  External documents reviewed: None  Cardiac tracing (EKG, telemetry) interpretation: None   radiology interpretation: None  Labs reviewed: Reviewed  Any tests that were considered but not ordered: None     Decision rules/scores evaluated (example [id number], Wells, etc): None     Discussed with: Patient     Care Planning  Shared decision making: Patient and ED staff  Code status and discussions: Full     Disposition  Social Determinants of Health that impact treatment or disposition: None  Estimated length of stay is overnight.      I confirmed that the patient's advanced care plan is present, code status is documented, and a surrogate decision maker is listed in the patient's medical record.      The patient's surrogate decision maker is  family  The patient was seen and examined by me on 10/28/2023 at 5.     Electronically signed by Monica Murillo DO, 10/28/23, 05:10 CDT.                             Isaías Charles MD   Physician  Hospitalist     Progress Notes     Signed     Date of Service: 10/28/23 0857  Creation Time: 10/28/23 0857     Signed         Patient admitted after midnight by Dr. Sahu for hypertensive urgency.  Patient reportedly had chest pressure, hypokalemia and mild hyponatremia.  Patient received the Cozaar and on Cardene drip.  2 sets of troponin negative  Patient been replaced with potassium.  Will repeat level  Blood pressure improved at 174/93, heart rate 118.  This is in comparison when patient had as high as 220/116.  We will work on optimizing blood pressure within the next 24 to 48 hours.  Wean off Cardene as tolerated and if able to manage this, I anticipate that patient can be moved later to a regular floor.  Head CT scan no acute process.  Partially empty appearance of sella turcica may be a normal variant.  CT angiogram chest no evidence of thoracic  aneurysm or dissection.  No visible pulmonary embolism.  Cardiomegaly and thickening of left ventricular myocardium.  Probable fatty liver  Chest x-ray no active disease  Pending laboratory include A1c, lipid panel-checking for modifiable risk factor for cardiovascular disease.  For risk stratification I will order for an echocardiogram-hypertensive cardiovascular disease     We do not have any information from pharmacist  or home meds patient reportedly was on amlodipine and Toprol-XL at some point early part of 2020 or latter part of 2019.        [START ON 10/29/2023] Chlorhexidine Gluconate Cloth, 1 application , Topical, Q24H  losartan, 50 mg, Oral, Q24H  mupirocin, 1 application , Each Nare, BID  potassium chloride, 10 mEq, Oral, Daily  senna-docusate sodium, 2 tablet, Oral, BID  sodium chloride, 10 mL, Intravenous, Q12H        I will start the patient on low-dose carvedilol.  Wean off Cardene drip.  Discussed with patient and nurse.  Time spent at least greater than 15 minutes review of record and ordering staff as well as interview of patient     Patient no longer has headache nor has chest pressure.  Symptoms she came in with  Telemetry showed sinus tachycardia  Normal respiratory effort  On Cardene drip at 5 mg/h                      8/23 0451 -- 125 Abnormal  -- 173/82 -- -- 99   10/28/23 0446 -- 118 -- 169/77 -- -- 100   10/28/23 0441 -- 124 Abnormal  -- 185/73 Abnormal  -- -- 99   10/28/23 0436 -- 122 Abnormal  -- 187/84 Abnormal  -- -- 99   10/28/23 0432 -- 116 -- 196/88 Abnormal  -- -- 98   10/28/23 0431 -- -- -- 196/88 Abnormal  -- -- --   10/28/23 0421 -- 117 -- 179/127 Abnormal  -- -- 99   10/28/23 0416 -- 123 Abnormal  -- 184/89 Abnormal  -- -- 98   10/28/23 0411 -- 125 Abnormal  -- 197/88 Abnormal  -- -- 99   10/28/23 0356 -- 118 -- 204/94 Abnormal  -- -- 99   10/28/23 0351 -- 107 -- 193/97 Abnormal  -- -- 99   10/28/23 0346 -- 104 -- 193/96 Abnormal  -- -- 98   10/28/23 0341 -- 104 -- 191/99  Abnormal  -- -- 99   10/28/23 0336 -- 101 -- 173/106 Abnormal  -- -- 98   10/28/23 0331 -- 96 -- 188/102 Abnormal  -- -- 96   10/28/23 0326 -- 91 -- 188/109 Abnormal  -- -- 96   10/28/23 0251 -- 101 -- 184/104 Abnormal  -- -- 94   10/28/23 0246 -- 106 -- 194/108 Abnormal  -- -- 92   10/28/23 0242 -- 104 -- 194/114 Abnormal  -- -- 95   10/28/23 0241 -- 115 -- 181/111 Abnormal  -- -- 93   10/28/23 0236 -- 97 -- 216/111 Abnormal  -- -- 97   10/28/23 0232 -- -- -- -- -- -- 97   10/28/23 0231 -- 101 -- 220/116 Abnormal  -- -- --   10/28/23 0220 98.9 (37.2) 106 14 225/100 Abnormal  Sitting room air 99     Isaías Arora MD   Physician  Hospitalist     Progress Notes     Signed     Date of Service: 10/29/23 0941  Creation Time: 10/29/23 0941     Signed                 [facility] Medicine Services  DISCHARGE SUMMARY         Date of Admission: 10/28/2023  Date of Discharge:  10/29/2023  Primary Care Physician: Provider, No Known     Presenting Problem/History of Present Illness:  Elevated blood pressure     Final Discharge Diagnoses:  Hypertensive urgency  Hypertensive cardiovascular disease  Hypokalemia resolved  Chest tightness in the setting of hypertensive emergency with negative troponins.  Noted abnormality in EKG twave changes.  No wall motion abnormalities on echocardiogram.  If any persistence of the chest redness or recurrence, can consider outpatient stress test.  Consults: None     Procedures Performed: None     Pertinent Test Results:   Results for orders placed during the hospital encounter of 10/28/23     Adult Transthoracic Echo Complete W/ Cont if Necessary Per Protocol     Interpretation Summary    Left ventricular systolic function is normal. Left ventricular ejection fraction appears to be 61 - 65%.    Left ventricular wall thickness is consistent with moderate concentric hypertrophy.    Normal right ventricular cavity size and systolic function  noted.    No significant valvular abnormalities identified on this study.        Imaging Results (All)         Procedure Component Value Units Date/Time     CT Angiogram Chest [[id number]] Collected: 10/28/23 0841       Updated: 10/28/23 0849     Narrative:       EXAMINATION:  CT ANGIOGRAM CHEST-  10/28/2023 2:55 AM CDT     HISTORY: Hypertension. Chest pain radiating to back. Headache.     COMPARISON : No comparison study.     DLP: 390 mGy-cm. Automated dosage reduction technique was utilized to  decrease patient dosage.     TECHNIQUE: CT angio was performed of the chest with IV contrast.  Coronal, sagittal and 3-D reconstruction were performed.     INDEPENDENT 3-D WORKSTATION UTILIZED FOR RECONSTRUCTION: Yes. A  radiologist was not present in the department.     MEDIASTINUM, HEART AND VASCULAR STRUCTURES: The thoracic aorta is normal  in caliber. No evidence of thoracic aortic aneurysm or dissection. The  pulmonary arteries are normal in caliber. There are no visible pulmonary  emboli. There is no lymphadenopathy. There is no pericardial effusion.  There is cardiomegaly with thickening of the left ventricular  myocardium. There is a small amount of residual thymus tissue in the  anterior mediastinum.     LUNGS: There is minimal dependent atelectasis. There is no dense  consolidation or pleural effusion.     UPPER ABDOMEN: There may be fatty liver. Liver size is upper limits of  normal.     BONES: There are degenerative changes of the visualized spine. There is  ossification of the posterior longitudinal ligament in the thoracic  spine.           Impression:       1. No evidence of thoracic aortic aneurysm or dissection. No visible  pulmonary embolus. There is cardiomegaly and thickening of the left  ventricular myocardium.  2. Minimal dependent atelectasis.  3. Probable fatty liver. Liver size upper limits of normal. The liver  measures 19 cm cephalocaudal.       This report was signed and finalized on 10/28/2023  8:46 AM CDT by Dr. Kranthi Hutchinson MD.        CT Head Without Contrast [[id number]] Collected: 10/28/23 0838       Updated: 10/28/23 0844     Narrative:       EXAMINATION:  CT HEAD WO CONTRAST-  10/28/2023 2:55 AM CDT     HISTORY: Headache. Hypertension.     TECHNIQUE: Multiple axial images were obtained through the brain without  contrast infusion. Multiplanar images were reconstructed.     DLP: 679 mGy-cm. Automated dosage reduction technique was utilized to  reduce patient dosage.     COMPARISON: No comparison study.     FINDINGS: There are no hemorrhage, edema or mass effect. There is a  partially empty appearance of the sella turcica. The ventricular system  is nondilated. The visualized paranasal sinuses are clear. The mastoid  air cells are clear. No calvarial fracture is seen.           Impression:       1. No hemorrhage, edema or mass effect.  2. Partially empty appearance of the sella turcica may be a normal  variant. It can also be associated with intracranial hypertension.        This report was signed and finalized on 10/28/2023 8:41 AM CDT by Dr. Kranthi Hutchinson MD.        XR Chest 1 View [[id number]] Collected: 10/28/23 0837       Updated: 10/28/23 0841     Narrative:       EXAMINATION:  XR CHEST 1 VW-  10/28/2023 2:25 AM CDT     HISTORY: Chest pain.     COMPARISON: No comparison study.     TECHNIQUE: Single view AP image.     FINDINGS:  The lungs are expanded bilaterally and clear. The pleural  spaces are clear. Heart size is upper limits of normal. There is  narrowing and spurring of the right AC joint. There are degenerative  changes of the spine. No acute bony abnormality is seen.           Impression:       No active disease is seen.           This report was signed and finalized on 10/28/2023 8:38 AM CDT by Dr. Kranthi Hutchinson MD.                LAB RESULTS:           Lab 10/28/23  0904 10/28/23  0232   WBC 8.15 7.05   HEMOGLOBIN 12.1 11.8*   HEMATOCRIT 39.7 38.6   PLATELETS 361 336   NEUTROS ABS  6.33 3.67   IMMATURE GRANS (ABS) 0.03 0.02   LYMPHS ABS 1.49 2.85   MONOS ABS 0.28 0.36   EOS ABS 0.00 0.12   MCV 80.9 81.6                Lab 10/29/23  0409 10/28/23  0904 10/28/23  0232   SODIUM 137 137 135*   POTASSIUM 3.3* 3.5 3.3*   CHLORIDE 102 102 99   CO2 24.0 22.0 23.0   ANION GAP 11.0 13.0 13.0   BUN 17 12 16   CREATININE 0.70 0.53* 0.71   EGFR 108.2 115.7 106.3   GLUCOSE 105* 120* 136*   CALCIUM 8.6 9.0 9.0   HEMOGLOBIN A1C  --  6.00*  --               Lab 10/28/23  0904   TOTAL PROTEIN 8.1   ALBUMIN 4.0   GLOBULIN 4.1   ALT (SGPT) 11   AST (SGOT) 15   BILIRUBIN 0.2   ALK PHOS 90               Lab 10/28/23  0412 10/28/23  0232   HSTROP T 9 7              Lab 10/28/23  0904   CHOLESTEROL 190   LDL CHOL 136*   HDL CHOL 41   TRIGLYCERIDES 72              Brief Urine Lab Results         None             Microbiology Results (last 10 days)         ** No results found for the last 240 hours. **                Hospital Course:   She is a 48-year-old woman admitted on October 28 by Dr. Sahu for hypertensive urgency.  Patient reportedly had chest pressure.  Her provisional diagnosis includes hypertensive urgency, hypokalemia and mild hyponatremia.  She initially had Cardene drip.  She was transitioned to oral antihypertensive medications.  BP better controlled.  Further follow-up and adjustment of medication care off primary care provider.     I am giving her a one-time dose of potassium 40 mEq for hypokalemia (3.3).  I do not have any particular reason why she would lose potassium.  Defer to primary care provider rechecking.     I will asked the nurse to give her less of primary care provider who she can set an appointment with.  We are limited because offices are closed on Sunday.  I strongly encouraged her to monitor her blood pressure and bring record to her primary care provider on current regimen of losartan and carvedilol.  I told her that medication can be adjusted to better control BP and prevent  "consequences of prolonged untreated hypertension.     Patient expressed that she is ready to go home.  All questions were answered to the best of my abilities.        Physical Exam on Discharge:  /88 (BP Location: Right arm, Patient Position: Lying)   Pulse 81   Temp 98.4 °F (36.9 °C) (Oral)   Resp 16   Ht 172.7 cm (68\")   Wt 91.6 kg (202 lb)   SpO2 98%   BMI 30.71 kg/m²   Physical Exam  GEN: Awake, alert, interactive, in NAD  HEENT: Atraumatic, PERRLA, EOMI, Anicteric, Trachea midline  Lungs: CTAB, no wheezing/rales/rhonchi  Heart: RRR, +S1/s2, no rub  ABD: soft, nt/nd, +BS, no guarding/rebound  Extremities: atraumatic, no cyanosis, no edema  Skin: no rashes or lesions  Neuro: AAOx3, no focal deficits  Psych: normal mood & affect        Condition on Discharge: Stable     Discharge Disposition:  Home or Self Care     Discharge Medications:      Discharge Medications          New Medications         Instructions Start Date   carvedilol 3.125 MG tablet  Commonly known as: COREG    3.125 mg, Oral, 2 Times Daily With Meals        losartan 50 MG tablet  Commonly known as: COZAAR    50 mg, Oral, Every 24 Hours Scheduled    Start Date: October 30, 2023                Continue These Medications         Instructions Start Date   acetaminophen 325 MG tablet  Commonly known as: TYLENOL    650 mg, Oral, Every 6 Hours PRN                           Discharge Diet:   Diet Instructions         Diet: Cardiac Diets; Healthy Heart (2-3 Na+); Thin (IDDSI 0)       Discharge Diet: Cardiac Diets     Cardiac Diet: Healthy Heart (2-3 Na+)     Fluid Consistency: Thin (IDDSI 0)                Activity at Discharge:   Activity Instructions         Gradually Increase Activity Until at Pre-Hospitalization Level                  Follow-up Appointments:   PCP of choice within 1 week     Test Results Pending at Discharge: none     Electronically signed by Isaías Charles MD, 10/29/23, 10:00 CDT.     Time: > 30  minutes.        "                No current facility-administered medications for this encounter.     Current Outpatient Medications   Medication Sig Dispense Refill    carvedilol (COREG) 3.125 MG tablet Take 1 tablet by mouth 2 (Two) Times a Day With Meals for 30 days. 60 tablet 0    losartan (COZAAR) 50 MG tablet Take 1 tablet by mouth Daily for 30 days. 30 tablet 0    acetaminophen (TYLENOL) 325 MG tablet Take 2 tablets by mouth Every 6 (Six) Hours As Needed for Mild Pain.

## 2023-10-30 NOTE — OUTREACH NOTE
AMBULATORY CASE MANAGEMENT NOTE    Name and Relationship of Patient/Support Person: Regi Jesus - Self  Sent e[facility] ltr thru [facility]  Ambulatory Case Management    10/30/2023, 14:27 EDT

## 2023-11-03 ENCOUNTER — OFFICE VISIT (OUTPATIENT)
Dept: INTERNAL MEDICINE | Facility: CLINIC | Age: 48
End: 2023-11-03
Payer: COMMERCIAL

## 2023-11-03 VITALS
HEART RATE: 92 BPM | SYSTOLIC BLOOD PRESSURE: 182 MMHG | TEMPERATURE: 97.8 F | WEIGHT: 195 LBS | OXYGEN SATURATION: 99 % | BODY MASS INDEX: 31.34 KG/M2 | HEIGHT: 66 IN | DIASTOLIC BLOOD PRESSURE: 106 MMHG

## 2023-11-03 DIAGNOSIS — E66.9 OBESITY (BMI 30-39.9): ICD-10-CM

## 2023-11-03 DIAGNOSIS — I51.7 LEFT VENTRICULAR HYPERTROPHY: ICD-10-CM

## 2023-11-03 DIAGNOSIS — R73.03 PRE-DIABETES: ICD-10-CM

## 2023-11-03 DIAGNOSIS — I10 ESSENTIAL HYPERTENSION: Primary | ICD-10-CM

## 2023-11-03 RX ORDER — LOSARTAN POTASSIUM 100 MG/1
100 TABLET ORAL
Qty: 30 TABLET | Refills: 3 | Status: SHIPPED | OUTPATIENT
Start: 2023-11-03 | End: 2024-03-02

## 2023-11-03 RX ORDER — CARVEDILOL 6.25 MG/1
6.25 TABLET ORAL 2 TIMES DAILY WITH MEALS
Qty: 60 TABLET | Refills: 3 | Status: SHIPPED | OUTPATIENT
Start: 2023-11-03 | End: 2024-03-02

## 2023-11-03 NOTE — PROGRESS NOTES
"    Chief Complaint  Establish Care and Hospital Follow Up Visit (Pt presented to  ED 10/28/23 due to not feeling well./Pt's BP was 220/116. /Pt complained of discomfort in the chest, headache. /Pt states she has felt well since being released. /BP has still been elevated. )    Subjective        Regi Jesus presents to [facility]  History of Present Illness  See below.     Objective   Vital Signs:  BP (!) 182/106 (BP Location: Left arm, Patient Position: Sitting, Cuff Size: Adult)   Pulse 92   Temp 97.8 °F (36.6 °C) (Infrared)   Ht 167.6 cm (66\")   Wt 88.5 kg (195 lb)   SpO2 99%   BMI 31.47 kg/m²   Estimated body mass index is 31.47 kg/m² as calculated from the following:    Height as of this encounter: 167.6 cm (66\").    Weight as of this encounter: 88.5 kg (195 lb).       BMI is >= 30 and <35. (Class 1 Obesity). The following options were offered after discussion;: exercise counseling/recommendations and nutrition counseling/recommendations    Physical Exam  HENT:      Head: Normocephalic and atraumatic.   Eyes:      Conjunctiva/sclera: Conjunctivae normal.      Pupils: Pupils are equal, round, and reactive to light.   Cardiovascular:      Rate and Rhythm: Normal rate and regular rhythm.      Heart sounds: Normal heart sounds.   Pulmonary:      Effort: Pulmonary effort is normal. No respiratory distress.      Breath sounds: Normal breath sounds.   Musculoskeletal:         General: No swelling.      Cervical back: Neck supple.   Skin:     General: Skin is warm and dry.      Findings: No rash.   Neurological:      General: No focal deficit present.      Mental Status: She is alert and oriented to person, place, and time.   Psychiatric:         Mood and Affect: Mood normal.         Behavior: Behavior normal.         Thought Content: Thought content normal.         Judgment: Judgment normal.        Result Review :  Results reviewed from her recent hospitalization:  1.  CMP on " 10/28 was unremarkable with the exception of a glucose of 120.  2.  Hemoglobin A1c was 6.0, prediabetic status in the patient.  3.  Total cholesterol 190, HDL 41, , triglycerides 72.  4.  CBC unremarkable.  5.  Troponin normal.    Results for orders placed during the hospital encounter of 10/28/23    Adult Transthoracic Echo Complete W/ Cont if Necessary Per Protocol    Interpretation Summary    Left ventricular systolic function is normal. Left ventricular ejection fraction appears to be 61 - 65%.    Left ventricular wall thickness is consistent with moderate concentric hypertrophy.    Normal right ventricular cavity size and systolic function noted.    No significant valvular abnormalities identified on this study.    CT of the head without contrast done on 10/28 showed no hemorrhage, edema, or mass effect.  Partially empty appearance of the sella turcica may be a normal variant.  Can also be associated with intracranial hypertension.    CTA of the chest showed no evidence of thoracic aortic aneurysm or dissection.  No visible pulmonary embolus.  Cardiomegaly and thickening of the left ventricular myocardium.  Minimal dependent atelectasis.  Probable fatty liver.  Liver size upper limits of normal.  Liver measures 19 cm cephalocaudal.         Assessment and Plan   Diagnoses and all orders for this visit:    1. Essential hypertension (Primary)  -     carvedilol (COREG) 6.25 MG tablet; Take 1 tablet by mouth 2 (Two) Times a Day With Meals for 120 days.  Dispense: 60 tablet; Refill: 3  -     losartan (COZAAR) 100 MG tablet; Take 1 tablet by mouth Daily for 120 days.  Dispense: 30 tablet; Refill: 3    2. Obesity (BMI 30-39.9)    3. Pre-diabetes    4. Left ventricular hypertrophy    Presents today to establish care.  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    She has a longstanding history of hypertension.  She was previously treated by primary care at [facility], Dr. Carmen.  She became lost to follow-up  during COVID-19.  They would not refill her prescriptions and she basically just went off of them.  She went to the emergency department at [facility] on 10/28 complaining of a headache, chest tightness.  Blood pressure at presentation was 225/100.  She ultimately required a Cardene drip and admission to the ICU by Dr. Charles.     She was started on carvedilol and losartan at conservative doses.  Blood pressure at discharge was 144/88.  She has been checking it at home and has had numbers as high as 170 systolic.  She is 182 systolic in the office today.  Diastolic blood pressure 106 today.  She is asymptomatic at present.  Plan to increase her carvedilol and losartan.  She can double up on her current supply and new refills were sent to [facility].  She was provided with a blood pressure log.  I have asked her to report back next week to the nursing staff on her blood pressures to see if we need to further titrate.  I would like to have her back in 1 month and we will repeat a BMP at that time.    She has been lost to follow-up on other issues as well.  She does have mild obesity.  She states that she has been trying to watch her diet and exercise.  She will try and do a better job.    She has been found to be prediabetic.  This was not related to her in the hospital.  We discussed that in the office today.  She does not feel like she eats too many sweets to begin with.  She will watch her diet.  No need for pharmaceutical intervention for now.    Overdue for Pap smear.  She is fine with me doing it on her next visit.  We can decide whether or not to start screening mammograms.  I also asked her to be thinking about colorectal cancer screening in the meantime.    She had her flu vaccine at the hospital already. I will try to find her certificate from the hospital.  Her most recent COVID-19 booster was on 2/28/2023, Moderna; found in the immunization registry.    We will follow back in 1 month to reassess  blood pressure and have her yearly physical.  I will also do hepatitis C screening and check TSH at her next appointment.       Follow Up   Return in about 4 weeks (around 12/1/2023) for Annual physical.  Patient was given instructions and counseling regarding her condition or for health maintenance advice. Please see specific information pulled into the AVS if appropriate.

## 2023-12-01 ENCOUNTER — OFFICE VISIT (OUTPATIENT)
Dept: INTERNAL MEDICINE | Facility: CLINIC | Age: 48
End: 2023-12-01
Payer: COMMERCIAL

## 2023-12-01 VITALS
WEIGHT: 198 LBS | HEART RATE: 82 BPM | DIASTOLIC BLOOD PRESSURE: 102 MMHG | OXYGEN SATURATION: 100 % | HEIGHT: 66 IN | BODY MASS INDEX: 31.82 KG/M2 | SYSTOLIC BLOOD PRESSURE: 184 MMHG | TEMPERATURE: 96.8 F

## 2023-12-01 DIAGNOSIS — I10 ESSENTIAL HYPERTENSION: ICD-10-CM

## 2023-12-01 DIAGNOSIS — Z00.00 ANNUAL PHYSICAL EXAM: Primary | ICD-10-CM

## 2023-12-01 DIAGNOSIS — Z12.31 ENCOUNTER FOR SCREENING MAMMOGRAM FOR MALIGNANT NEOPLASM OF BREAST: ICD-10-CM

## 2023-12-01 DIAGNOSIS — Z11.59 ENCOUNTER FOR HEPATITIS C SCREENING TEST FOR LOW RISK PATIENT: ICD-10-CM

## 2023-12-01 DIAGNOSIS — E66.9 OBESITY (BMI 30-39.9): ICD-10-CM

## 2023-12-01 DIAGNOSIS — Z12.11 SCREENING FOR COLON CANCER: ICD-10-CM

## 2023-12-01 DIAGNOSIS — R73.03 PRE-DIABETES: ICD-10-CM

## 2023-12-01 DIAGNOSIS — Z12.4 PAP SMEAR FOR CERVICAL CANCER SCREENING: ICD-10-CM

## 2023-12-01 RX ORDER — AMLODIPINE BESYLATE 5 MG/1
5 TABLET ORAL DAILY
Qty: 90 TABLET | Refills: 1 | Status: SHIPPED | OUTPATIENT
Start: 2023-12-01

## 2023-12-01 RX ORDER — CARVEDILOL 12.5 MG/1
12.5 TABLET ORAL 2 TIMES DAILY WITH MEALS
Qty: 180 TABLET | Refills: 1 | Status: SHIPPED | OUTPATIENT
Start: 2023-12-01

## 2023-12-01 NOTE — PROGRESS NOTES
"    Chief Complaint  Hypertension (4 week f/u;/Pt states BP has been fluctuating. ) and Annual Exam    Subjective        Regi Jesus presents to [facility]  Hypertension      See below.    Objective   Vital Signs:  BP (!) 184/102 (BP Location: Left arm, Patient Position: Sitting, Cuff Size: Adult)   Pulse 82   Temp 96.8 °F (36 °C) (Infrared)   Ht 167.6 cm (66\")   Wt 89.8 kg (198 lb)   SpO2 100%   BMI 31.96 kg/m²   Estimated body mass index is 31.96 kg/m² as calculated from the following:    Height as of this encounter: 167.6 cm (66\").    Weight as of this encounter: 89.8 kg (198 lb).     My DIANA Dodge was present for her pap smear.   Physical Exam  Exam conducted with a chaperone present.   HENT:      Head: Normocephalic and atraumatic.   Eyes:      Conjunctiva/sclera: Conjunctivae normal.      Pupils: Pupils are equal, round, and reactive to light.   Cardiovascular:      Rate and Rhythm: Normal rate and regular rhythm.      Heart sounds: Normal heart sounds.   Pulmonary:      Effort: Pulmonary effort is normal. No respiratory distress.      Breath sounds: Normal breath sounds.   Genitourinary:     General: Normal vulva.      Vagina: No vaginal discharge.   Musculoskeletal:         General: No swelling.      Cervical back: Neck supple.   Skin:     General: Skin is warm and dry.      Findings: No rash.   Neurological:      General: No focal deficit present.      Mental Status: She is alert and oriented to person, place, and time.   Psychiatric:         Mood and Affect: Mood normal.         Behavior: Behavior normal.         Thought Content: Thought content normal.         Judgment: Judgment normal.        Result Review :  No new labs since her last visit.  Planning on obtaining a BMP, TSH, and hep C antibody today.         Assessment and Plan   Diagnoses and all orders for this visit:    1. Annual physical exam (Primary)  -     TSH Rfx On Abnormal To Free T4    2. Essential " hypertension  -     Basic metabolic panel  -     carvedilol (COREG) 12.5 MG tablet; Take 1 tablet by mouth 2 (Two) Times a Day With Meals.  Dispense: 180 tablet; Refill: 1  -     amLODIPine (NORVASC) 5 MG tablet; Take 1 tablet by mouth Daily.  Dispense: 90 tablet; Refill: 1    3. Obesity (BMI 30-39.9)    4. Pre-diabetes    5. Encounter for screening mammogram for malignant neoplasm of breast  -     Mammo Screening Digital Tomosynthesis Bilateral With CAD; Future    6. Pap smear for cervical cancer screening  -     IGP,rfx Aptima HPV All Pth    7. Screening for colon cancer  -     Cologuard - Stool, Per Rectum; Future    8. Encounter for hepatitis C screening test for low risk patient  -     Hepatitis C antibody    Presents today for 1 month follow-up of hypertension.      She is hypertensive at 184/102 in the office today.  She provides a hypertension log.  She has been taking blood pressures consistently twice daily since she last saw me.  Most blood pressures appear to be running hypertensive.  Systolic blood pressure is generally less than 170.  There is 1 pressure of 171 on the list.  Diastolic blood pressure typically running 80s.  Highest diastolic blood pressure on the list is 98.  Pulse consistently in the upper 60s, 70s.  She has not been having headache or symptoms.  I am going to increase her carvedilol.  Continue losartan.  Add amlodipine.  Continue to keep her hypertension log.  I want her to call me sooner than her next follow-up appointment if she continues to run hypertensive.    She has been trying to watch what she has been eating.  She weighed 202 pounds at the end of October and now weighs 198 pounds.  Continue to modify diet and exercise as able.    She has been watching concentrated sweets.  Plan to follow-up her hemoglobin A1c at her next visit.  It was discovered to be 6.0 at the end of October.    Wants to have a screening mammogram done.  Family history of breast cancer with regards to her  mother.    Completed Pap smear in the office today.  Will follow-up on the studies.    Wants to screen for colorectal cancer.  Risk benefits and alternatives of this were discussed.  May consider a colonoscopy later on, but wants to do Cologuard for now.    Plan to see back in 3 months, but she will contact us sooner if she continues to run overtly hypertensive.  Will follow-up on the above labs and contact her as well.       Follow Up   Return in about 3 months (around 3/1/2024) for Recheck.  Patient was given instructions and counseling regarding her condition or for health maintenance advice. Please see specific information pulled into the AVS if appropriate.

## 2023-12-02 LAB
BUN SERPL-MCNC: 12 MG/DL (ref 6–20)
BUN/CREAT SERPL: 18.5 (ref 7–25)
CALCIUM SERPL-MCNC: 9.6 MG/DL (ref 8.6–10.5)
CHLORIDE SERPL-SCNC: 102 MMOL/L (ref 98–107)
CO2 SERPL-SCNC: 26.5 MMOL/L (ref 22–29)
CREAT SERPL-MCNC: 0.65 MG/DL (ref 0.57–1)
EGFRCR SERPLBLD CKD-EPI 2021: 110.1 ML/MIN/1.73
GLUCOSE SERPL-MCNC: 100 MG/DL (ref 65–99)
HCV IGG SERPL QL IA: NON REACTIVE
POTASSIUM SERPL-SCNC: 3.9 MMOL/L (ref 3.5–5.2)
SODIUM SERPL-SCNC: 138 MMOL/L (ref 136–145)
TSH SERPL DL<=0.005 MIU/L-ACNC: 2.14 UIU/ML (ref 0.27–4.2)

## 2023-12-07 LAB
CONV .: NORMAL
CYTOLOGIST CVX/VAG CYTO: NORMAL
CYTOLOGY CVX/VAG DOC CYTO: NORMAL
CYTOLOGY CVX/VAG DOC THIN PREP: NORMAL
DX ICD CODE: NORMAL
DX ICD CODE: NORMAL
HIV 1 & 2 AB SER-IMP: NORMAL
OTHER STN SPEC: NORMAL
PATHOLOGIST CVX/VAG CYTO: NORMAL
RECOM F/U CVX/VAG CYTO: NORMAL
STAT OF ADQ CVX/VAG CYTO-IMP: NORMAL

## 2023-12-08 LAB
NCCN CRITERIA FLAG: ABNORMAL
TYRER CUZICK SCORE: 20.1

## 2023-12-08 RX ORDER — METRONIDAZOLE 500 MG/1
500 TABLET ORAL 2 TIMES DAILY
Qty: 14 TABLET | Refills: 0 | Status: SHIPPED | OUTPATIENT
Start: 2023-12-08

## 2023-12-11 ENCOUNTER — APPOINTMENT (OUTPATIENT)
Dept: OTHER | Facility: HOSPITAL | Age: 48
End: 2023-12-11
Payer: COMMERCIAL

## 2023-12-11 ENCOUNTER — HOSPITAL ENCOUNTER (OUTPATIENT)
Dept: MAMMOGRAPHY | Facility: HOSPITAL | Age: 48
Discharge: HOME | End: 2023-12-11
Payer: COMMERCIAL

## 2023-12-11 DIAGNOSIS — Z12.31 ENCOUNTER FOR SCREENING MAMMOGRAM FOR MALIGNANT NEOPLASM OF BREAST: ICD-10-CM

## 2023-12-11 PROCEDURE — 77067 SCR MAMMO BI INCL CAD: CPT

## 2023-12-11 PROCEDURE — 77063 BREAST TOMOSYNTHESIS BI: CPT

## 2023-12-14 DIAGNOSIS — Z91.89 AT HIGH RISK FOR BREAST CANCER: Primary | ICD-10-CM

## 2024-01-03 ENCOUNTER — TELEPHONE (OUTPATIENT)
Dept: INTERNAL MEDICINE | Facility: CLINIC | Age: 49
End: 2024-01-03

## 2024-01-03 NOTE — TELEPHONE ENCOUNTER
Caller: Regi Jesus    Relationship: Self    Best call back number: [phone]     What is the best time to reach you: ANY    Who are you requesting to speak with (clinical staff, provider,  specific staff member): CLINICAL    Do you know the name of the person who called: SELF    What was the call regarding: WAS ADVISED TO CALL AND LET THE OFFICE KNOW PATIENT'S BLOOD PRESSURE CONTINUES TO GO UP AND DOWN. WAS TOLD TO CALL IN AND POSSIBLY HAVE MEDICATIONS ADJUSTED IF IT CONTINUED LIKE THAT    Is it okay if the provider responds through MyChart: YES OR CALL BACK

## 2024-01-03 NOTE — TELEPHONE ENCOUNTER
Pt states her BP has been ranging from 120's/70's to 150's/80's. Pulse has been in the 70's. Pt states she has been compliant w/ medication. Pt seeking different alternatives.   Pt denies chest pain, sob, dizziness, nausea, headaches.

## 2024-01-24 ENCOUNTER — OFFICE VISIT (OUTPATIENT)
Age: 49
End: 2024-01-24
Payer: COMMERCIAL

## 2024-01-24 VITALS
HEIGHT: 66 IN | BODY MASS INDEX: 31.77 KG/M2 | SYSTOLIC BLOOD PRESSURE: 120 MMHG | WEIGHT: 197.7 LBS | DIASTOLIC BLOOD PRESSURE: 72 MMHG

## 2024-01-24 DIAGNOSIS — Z12.39 BREAST CANCER SCREENING, HIGH RISK PATIENT: Primary | ICD-10-CM

## 2024-01-24 NOTE — PROGRESS NOTES
Office New Patient History and Physical:     Referring Provider: CRUZ Sorenson,*    Chief complaint: High risk of breast cancer discussion     Subjective .     History of present illness:  Regi Jesus is a 48 y.o. female who presents to the clinic for high risk breast cancer discussion. She does not meet NCCN criteria for genetic testing.    Breast History information:   Tyrer-Cuzick score: 20.1  Prior abnormal mammograms: none  Prior breast biopsies: none  Palpable breast masses: none  Nipple discharge: none  Age at first menses: 13  Age at menopause: n/a  Number of biological children: 2  Age at first birth: 22  Years on birth control: 10  Years on HRT: none  Family history of breast cancer: mother, in her 40s  Smoking History: none  Alcohol use: occasional  BMI: 31.91     History  Past Medical History:   Diagnosis Date    Dizziness     Dyslipidemia     Elevated cholesterol     Hypertension     Hypokalemia     Palpitations     Periapical abscess     Rita Parkinson White pattern seen on electrocardiogram    , History reviewed. No pertinent surgical history.,   Family History   Problem Relation Age of Onset    Breast cancer Mother         40's    Diabetes Mother     Cancer Mother     Cancer Maternal Aunt    ,   Social History     Tobacco Use    Smoking status: Never    Smokeless tobacco: Never   Vaping Use    Vaping Use: Never used   Substance Use Topics    Alcohol use: Yes     Comment: occ    Drug use: Never   , (Not in a hospital admission)   and Allergies:  Patient has no known allergies.    Current Outpatient Medications:     acetaminophen (TYLENOL) 325 MG tablet, Take 2 tablets by mouth Every 6 (Six) Hours As Needed for Mild Pain., Disp: , Rfl:     amLODIPine (NORVASC) 5 MG tablet, Take 1 tablet by mouth Daily., Disp: 90 tablet, Rfl: 1    carvedilol (COREG) 12.5 MG tablet, Take 1 tablet by mouth 2 (Two) Times a Day With Meals., Disp: 180 tablet, Rfl: 1    losartan (COZAAR) 100 MG tablet, Take 1  "tablet by mouth Daily for 120 days., Disp: 30 tablet, Rfl: 3    Objective     Vital Signs   /72   Ht 167.6 cm (66\")   Wt 89.7 kg (197 lb 11.2 oz)   BMI 31.91 kg/m²      Physical Exam:  General appearance - alert, well appearing, and in no distress and oriented to person, place, and time  Mental status - alert, oriented to person, place, and time, normal mood, behavior, speech, dress, motor activity, and thought processes  Eyes - pupils equal and reactive, extraocular eye movements intact, sclera anicteric  Neck - supple, no significant adenopathy  Chest - no tachypnea, retractions or cyanosis  Heart - normal rate and regular rhythm  Neurological - alert, oriented, normal speech, no focal findings or movement disorder noted  Skin - normal coloration and turgor, no rashes, no suspicious skin lesions noted  Breast- Bilateral breasts without obvious deformities. Bilateral nipples everted. Patient examined in the supine position with the ipsilateral arm above the head. No palpable masses bilaterally. No nipple discharge bilaterally. No palpable axillary nor supraclavicular adenopathy bilaterally.     Results Review:  Result Review :            Mammo Screening Digital Tomosynthesis Bilateral With CAD (12/11/2023 11:16)   IMPRESSION:  1. No mammographic evidence of malignancy.  Recommend routine annual  screening mammography.  2. Tyrer-Cuzick lifetime risk 20.1 percent. American cancer Society  guidelines recommend screening MRI for women with estimated lifetime  risk of breast cancer greater than 20 percent.    3. Breast density notification will be sent to the patient.    Assessment & Plan       Diagnoses and all orders for this visit:    1. Breast cancer screening, high risk patient (Primary)  -     MRI Breast Bilateral Screening With & Without Contrast; Future  -     Mammo Screening Digital Tomosynthesis Bilateral With CAD; Future         Regi Jesus is a 48 y.o. female with a high risk of breast cancer " based on her Tyrer Cuzick score of 20.1. She did not meet criteria for genetic testing.  I went over risk reduction and early detection strategies with her. (1) Early detection: twice yearly clinical breast exam. Yearly bilateral breast MRI and yearly bilateral mammogram so there is imaging every 6 months. (2) Prevention: First step of prevention is lifestyle modification with healthy diet, maintaining a healthy BMI, smoking cessation (including vaping), regular exercise, <2 alcoholic drinks per day. I highly encouraged smoking cessation to decrease her risk. The second option on prevention is chemoprophylaxis with anti-hormone therapy. We discussed the [id number]% risk reduction with medication. I offered her a referral to medical oncology to discuss risks and benefits in detail and she elected to defer at this time. Third option is surgical prevention with bilateral prophylactic mastectomies with or without reconstruction. We discussed that this is a big operation and will require 1-2 months of recovery with risks of bleeding, infection, damage to surrounding structures. She understands that bilateral mastectomy does not decrease her risk to 0%. She has elected to defer at this time. My office has scheduled her bilateral breast MRI for June 2024. She will follow up with me after breast MRI. She is to call if she has any concerns in the meantime.     Follow up:       Return in about 5 months (around 6/24/2024) for [facility] 6 mo f/u .      Cat Perera PA-C  01/24/24  10:29 CST

## 2024-01-25 DIAGNOSIS — I10 ESSENTIAL HYPERTENSION: ICD-10-CM

## 2024-01-25 RX ORDER — LOSARTAN POTASSIUM 100 MG/1
100 TABLET ORAL
Qty: 30 TABLET | Refills: 3 | OUTPATIENT
Start: 2024-01-25 | End: 2024-05-24

## 2024-02-26 DIAGNOSIS — I10 ESSENTIAL HYPERTENSION: ICD-10-CM

## 2024-02-26 RX ORDER — LOSARTAN POTASSIUM 100 MG/1
100 TABLET ORAL
Qty: 90 TABLET | Refills: 3 | Status: SHIPPED | OUTPATIENT
Start: 2024-02-26

## 2024-03-08 ENCOUNTER — OFFICE VISIT (OUTPATIENT)
Dept: INTERNAL MEDICINE | Facility: CLINIC | Age: 49
End: 2024-03-08
Payer: COMMERCIAL

## 2024-03-08 VITALS
OXYGEN SATURATION: 98 % | BODY MASS INDEX: 31.5 KG/M2 | WEIGHT: 196 LBS | TEMPERATURE: 97.3 F | HEART RATE: 66 BPM | SYSTOLIC BLOOD PRESSURE: 140 MMHG | DIASTOLIC BLOOD PRESSURE: 90 MMHG | HEIGHT: 66 IN

## 2024-03-08 DIAGNOSIS — R73.03 PRE-DIABETES: ICD-10-CM

## 2024-03-08 DIAGNOSIS — I10 ESSENTIAL HYPERTENSION: Primary | ICD-10-CM

## 2024-03-08 DIAGNOSIS — E66.9 OBESITY (BMI 30-39.9): ICD-10-CM

## 2024-03-08 DIAGNOSIS — J30.2 SEASONAL ALLERGIES: ICD-10-CM

## 2024-03-08 DIAGNOSIS — Z91.89 INCREASED RISK OF BREAST CANCER: ICD-10-CM

## 2024-03-08 LAB — HBA1C MFR BLD: 5.7 % (ref 4.5–5.7)

## 2024-03-08 RX ORDER — FLUTICASONE PROPIONATE 50 MCG
2 SPRAY, SUSPENSION (ML) NASAL DAILY
Qty: 16 G | Refills: 1 | Status: SHIPPED | OUTPATIENT
Start: 2024-03-08

## 2024-03-08 RX ORDER — CETIRIZINE HYDROCHLORIDE 10 MG/1
10 TABLET ORAL DAILY
Qty: 30 TABLET | Refills: 1 | Status: SHIPPED | OUTPATIENT
Start: 2024-03-08

## 2024-03-08 NOTE — PROGRESS NOTES
"    Chief Complaint  Follow-up (3 mo f/u), Hypertension, and Prediabetes (A1c- 5.7%)    Subjective        Regi Jesus presents to [facility]  Hypertension    See below.     Objective   Vital Signs:  /90 (BP Location: Left arm, Patient Position: Sitting, Cuff Size: Adult)   Pulse 66   Temp 97.3 °F (36.3 °C) (Infrared)   Ht 167.6 cm (66\")   Wt 88.9 kg (196 lb)   SpO2 98%   BMI 31.64 kg/m²   Estimated body mass index is 31.64 kg/m² as calculated from the following:    Height as of this encounter: 167.6 cm (66\").    Weight as of this encounter: 88.9 kg (196 lb).       Physical Exam  Constitutional:       Appearance: She is obese.   HENT:      Head: Normocephalic and atraumatic.      Right Ear: Tympanic membrane and ear canal normal.      Left Ear: Tympanic membrane and ear canal normal.      Mouth/Throat:      Mouth: Mucous membranes are moist.      Pharynx: Oropharynx is clear.   Eyes:      Conjunctiva/sclera: Conjunctivae normal.      Pupils: Pupils are equal, round, and reactive to light.   Cardiovascular:      Rate and Rhythm: Normal rate and regular rhythm.      Heart sounds: Normal heart sounds.   Pulmonary:      Effort: Pulmonary effort is normal. No respiratory distress.      Breath sounds: Normal breath sounds.   Musculoskeletal:         General: No swelling.   Skin:     General: Skin is warm and dry.      Findings: No rash.   Neurological:      General: No focal deficit present.      Mental Status: She is alert and oriented to person, place, and time.   Psychiatric:         Mood and Affect: Mood normal.         Behavior: Behavior normal.         Thought Content: Thought content normal.         Judgment: Judgment normal.       Result Review :  HbA1c was 6.0 in October and is now 5.7.           Assessment and Plan   Diagnoses and all orders for this visit:    1. Essential hypertension (Primary)    2. Pre-diabetes  -     POC Glycated Hemoglobin, Total    3. " Obesity (BMI 30-39.9)    4. Seasonal allergies  -     fluticasone (FLONASE) 50 MCG/ACT nasal spray; Instill 2 sprays into the nostril(s) as directed by provider Daily.  Dispense: 16 g; Refill: 1  -     cetirizine (zyrTEC) 10 MG tablet; Take 1 tablet by mouth Daily.  Dispense: 30 tablet; Refill: 1    5. Increased risk of breast cancer    Presents today for 3-month follow-up.    Her blood pressure is 140/90 in the office today.  However, she does state that she has been taking it very frequently in the outpatient setting.  She did not bring her log with her.  She states that most systolic blood pressures have been ranging 120-130 with most all diastolic pressures being in the 80s.  In light of this, we will hold off on increasing her amlodipine or carvedilol.  She understands that if her numbers trend higher than this in the near future that she should call the office back and we will likely increase the amlodipine to 10 mg.  For now, continue amlodipine at 5 mg, carvedilol 12.5 mg twice daily, and losartan at 100 mg daily.  Overall, she has had significant improvement of her blood pressure since we first started seeing her back in November.  Prior to that, she had been hospitalized for a hypertensive urgency.    Hemoglobin A1c is down to 5.7 from 6.0.  She mainly contributes this to just to the knowledge of knowing that she is prediabetic.  She has been limiting Sprite intake, which she feels was the main culprit of sugar intake.  Continue the same and monitor in 6 months.    BMI is currently 31.64.  When she was hospitalized at the end of October she weighed 202 pounds.  She is 196 pounds today.  Continue to monitor diet and exercise more frequently.    She has problems with seasonal allergies.  She states that these are starting to ramp up with the recent blossoming of trees.  Prescribed Flonase and Zyrtec.    She was identified to be at increased risk of breast cancer on recent screening.  She has an appointment  to see Cat Perera PA-C with Dr. Brown in June.  She is scheduled to have a breast MRI prior to this appointment.    Plan to follow her back in 3 months to reassess hypertension.  She knows that if she has problems between now and then that she can return.  She was given a new blood pressure log to continue ambulatory monitoring.  Encouraged her to bring the log with her to her next appointment.         Follow Up   Return in about 3 months (around 6/8/2024) for Recheck.  Patient was given instructions and counseling regarding her condition or for health maintenance advice. Please see specific information pulled into the AVS if appropriate.      TERRY Sorenson DO       Electronically signed by CRUZ Sorenson DO, 03/08/24, 8:41 AM CST.

## 2024-05-05 DIAGNOSIS — I10 ESSENTIAL HYPERTENSION: ICD-10-CM

## 2024-05-06 RX ORDER — AMLODIPINE BESYLATE 5 MG/1
5 TABLET ORAL DAILY
Qty: 90 TABLET | Refills: 3 | Status: SHIPPED | OUTPATIENT
Start: 2024-05-06

## 2024-05-22 DIAGNOSIS — I10 ESSENTIAL HYPERTENSION: ICD-10-CM

## 2024-05-22 RX ORDER — CARVEDILOL 12.5 MG/1
12.5 TABLET ORAL 2 TIMES DAILY WITH MEALS
Qty: 180 TABLET | Refills: 1 | Status: SHIPPED | OUTPATIENT
Start: 2024-05-22

## 2024-06-03 ENCOUNTER — HOSPITAL ENCOUNTER (OUTPATIENT)
Dept: MRI IMAGING | Facility: HOSPITAL | Age: 49
Discharge: HOME | End: 2024-06-03
Payer: COMMERCIAL

## 2024-06-03 DIAGNOSIS — Z12.39 BREAST CANCER SCREENING, HIGH RISK PATIENT: ICD-10-CM

## 2024-06-03 LAB — CREAT BLDA-MCNC: 0.7 MG/DL (ref 0.6–1.3)

## 2024-06-03 PROCEDURE — 0 GADOBENATE DIMEGLUMINE 529 MG/ML SOLUTION

## 2024-06-03 PROCEDURE — 82565 ASSAY OF CREATININE: CPT

## 2024-06-03 PROCEDURE — 77049 MRI BREAST C-+ W/CAD BI: CPT

## 2024-06-03 PROCEDURE — A9577 INJ MULTIHANCE: HCPCS

## 2024-06-03 RX ADMIN — GADOBENATE DIMEGLUMINE 17 ML: 529 INJECTION, SOLUTION INTRAVENOUS at 10:28

## 2024-06-04 DIAGNOSIS — R59.0 AXILLARY LYMPHADENOPATHY: Primary | ICD-10-CM

## 2024-06-04 NOTE — PROGRESS NOTES
Patient made aware of results. I let her know that we would be ordering the follow up u/s. Advised to keep follow up appointment.

## 2024-06-07 ENCOUNTER — OFFICE VISIT (OUTPATIENT)
Dept: INTERNAL MEDICINE | Facility: CLINIC | Age: 49
End: 2024-06-07
Payer: COMMERCIAL

## 2024-06-07 ENCOUNTER — HOSPITAL ENCOUNTER (OUTPATIENT)
Dept: ULTRASOUND IMAGING | Facility: HOSPITAL | Age: 49
Discharge: HOME | End: 2024-06-07
Payer: COMMERCIAL

## 2024-06-07 VITALS
HEIGHT: 66 IN | OXYGEN SATURATION: 100 % | BODY MASS INDEX: 32.59 KG/M2 | DIASTOLIC BLOOD PRESSURE: 80 MMHG | SYSTOLIC BLOOD PRESSURE: 136 MMHG | HEART RATE: 80 BPM | TEMPERATURE: 97.8 F | WEIGHT: 202.8 LBS

## 2024-06-07 DIAGNOSIS — R59.0 AXILLARY LYMPHADENOPATHY: ICD-10-CM

## 2024-06-07 DIAGNOSIS — R59.0 AXILLARY LYMPHADENOPATHY: Primary | ICD-10-CM

## 2024-06-07 DIAGNOSIS — R73.03 PRE-DIABETES: ICD-10-CM

## 2024-06-07 DIAGNOSIS — E66.9 OBESITY (BMI 30-39.9): ICD-10-CM

## 2024-06-07 DIAGNOSIS — I10 ESSENTIAL HYPERTENSION: Primary | ICD-10-CM

## 2024-06-07 PROCEDURE — 76882 US LMTD JT/FCL EVL NVASC XTR: CPT

## 2024-06-07 PROCEDURE — 99214 OFFICE O/P EST MOD 30 MIN: CPT | Performed by: INTERNAL MEDICINE

## 2024-06-07 NOTE — PROGRESS NOTES
Patient called and made aware of results. Ordering u/s guided biopsy as patient has not had any recent infection, immunization, or tattoo. Also moving appointment until after biopsy results.

## 2024-06-07 NOTE — PROGRESS NOTES
"    Chief Complaint  Hypertension (3 mo follow up ) and Prediabetes    Subjective        Regi Tarsha Jesus presents to [facility]  Hypertension      See below.     Objective   Vital Signs:  /80 (BP Location: Left arm, Patient Position: Sitting, Cuff Size: Adult)   Pulse 80   Temp 97.8 °F (36.6 °C) (Temporal)   Ht 167.6 cm (65.98\")   Wt 92 kg (202 lb 12.8 oz)   SpO2 100%   BMI 32.75 kg/m²   Estimated body mass index is 32.75 kg/m² as calculated from the following:    Height as of this encounter: 167.6 cm (65.98\").    Weight as of this encounter: 92 kg (202 lb 12.8 oz).         Physical Exam  HENT:      Head: Normocephalic and atraumatic.   Eyes:      Conjunctiva/sclera: Conjunctivae normal.      Pupils: Pupils are equal, round, and reactive to light.   Cardiovascular:      Rate and Rhythm: Normal rate and regular rhythm.      Heart sounds: Normal heart sounds.   Pulmonary:      Effort: Pulmonary effort is normal. No respiratory distress.      Breath sounds: Normal breath sounds.   Musculoskeletal:         General: No swelling.      Cervical back: Neck supple.   Lymphadenopathy:      Upper Body:      Right upper body: No axillary adenopathy.      Left upper body: Axillary adenopathy (1 small. nontender node centrally) present.   Skin:     General: Skin is warm and dry.      Findings: No rash.   Neurological:      General: No focal deficit present.      Mental Status: She is alert and oriented to person, place, and time.   Psychiatric:         Mood and Affect: Mood normal.         Behavior: Behavior normal.         Thought Content: Thought content normal.         Judgment: Judgment normal.        Result Review :  We will recheck a hemoglobin A1c at her next visit.  It had gone to 5.7 from 6.0 in March.         Assessment and Plan   Diagnoses and all orders for this visit:    1. Essential hypertension (Primary)  -     CBC & Differential; Future  -     Comprehensive metabolic " panel; Future  -     Lipid Panel; Future  -     TSH Rfx On Abnormal To Free T4; Future    2. Pre-diabetes  -     Hemoglobin A1c; Future    3. Obesity (BMI 30-39.9)    4. Axillary lymphadenopathy       Presents today for 3-month follow-up. Last seen in March.    Blood pressure 136/80.  This is good control for her.  She has been getting similar readings when checking at work and home.  When we first started seeing her in November, she was consistently having systolic blood pressures of greater than 180 and diastolic blood pressures greater than 100.  Continue carvedilol, losartan, amlodipine at present doses.  Provided a new blood pressure log and encouraged her to bring readings in her next appointment.    Recheck hemoglobin A1c at her next appointment.    BMI back up slightly from March.  32.75 today after being 31.64.    She was previously identified to be at increased risk of breast cancer on screening.  She had a breast MRI on Monday, 6/3.  No suspicious breast findings.  1 lymph node in the left axilla is more prominent than others.  Does demonstrate some cortical thickening.  Lymph node may be reactive.  She is arranged to have an ultrasound of her left axilla today.  She sees Dr. Brown next week.  I will set a reminder to follow-up on her ultrasound results and her visit with her.    She had a Pap smear done by me in December that was negative for intraepithelial lesion or malignancy.  She did have trichomonas vaginalis that was treated.  Next Pap smear due in December 2026.    Negative Cologuard in January 2024.  Due again in January 2027.    Plan to see her back in 6 months at this point.  However, she knows that if her blood pressure falls out of good control that she should call sooner for reevaluation.  She states that she has all of her medications with refills at this point.  She knows that she can reach out at any time with problems.      Follow Up   Return in about 6 months (around 12/7/2024) for  Annual physical.  Patient was given instructions and counseling regarding her condition or for health maintenance advice. Please see specific information pulled into the AVS if appropriate.      TERRY Sorenson DO       Electronically signed by CRUZ Sorenson DO, 06/07/24, 8:15 AM CDT.

## 2024-06-11 ENCOUNTER — HOSPITAL ENCOUNTER (OUTPATIENT)
Dept: ULTRASOUND IMAGING | Facility: HOSPITAL | Age: 49
Discharge: HOME | End: 2024-06-11
Payer: COMMERCIAL

## 2024-06-11 DIAGNOSIS — R59.0 AXILLARY LYMPHADENOPATHY: ICD-10-CM

## 2024-06-11 PROCEDURE — 88305 TISSUE EXAM BY PATHOLOGIST: CPT

## 2024-06-11 PROCEDURE — 88172 CYTP DX EVAL FNA 1ST EA SITE: CPT

## 2024-06-11 PROCEDURE — 76942 ECHO GUIDE FOR BIOPSY: CPT

## 2024-06-13 LAB
BEAKER LAB AP INTRAOPERATIVE CONSULTATION: NORMAL
CYTO UR: NORMAL
LAB AP CASE REPORT: NORMAL
LAB AP DIAGNOSIS COMMENT: NORMAL
LAB AP FLOW CYTOMETRY SUMMARY: NORMAL
Lab: NORMAL
PATH REPORT.FINAL DX SPEC: NORMAL
PATH REPORT.GROSS SPEC: NORMAL

## 2024-06-17 ENCOUNTER — TELEPHONE (OUTPATIENT)
Dept: SURGERY | Facility: CLINIC | Age: 49
End: 2024-06-17
Payer: COMMERCIAL

## 2024-06-17 ENCOUNTER — TELEPHONE (OUTPATIENT)
Dept: INTERNAL MEDICINE | Facility: CLINIC | Age: 49
End: 2024-06-17
Payer: COMMERCIAL

## 2024-06-17 NOTE — TELEPHONE ENCOUNTER
----- Message from Cat Perera sent at 6/17/2024 12:23 PM CDT -----  Will you please call her and let her know that her biopsy was benign? Follow up with Dr. Brown on 6/19. She will discuss next steps at that visit.

## 2024-06-17 NOTE — TELEPHONE ENCOUNTER
Left axillary lymph node, fine-needle core biopsies and touch preparations:  A.  Lymphoid tissue containing a few melanophages.  B.  No abscesses or granulomata identified.  C.  No Jeffrey-Be cells identified.  D.  A few fragments of adipose tissue are also present.  E.  No evidence of primary or metastatic malignancy.    The surgical office should be getting in touch with her about the above.  However, I wanted to check on it as well.      Electronically signed by CRUZ Sorenson DO, 06/17/24, 1:16 PM CDT.

## 2024-06-17 NOTE — PROGRESS NOTES
Patient called and made aware of results. Advised to keep follow up appointment with Dr. Brown on 6/19. She voiced understanding.

## 2024-06-17 NOTE — TELEPHONE ENCOUNTER
Called spoke to patient informed her that her biopsy was benign and to follow up with Dr. Brown on 6/19. She will discuss next steps at that visit. Patient voiced understanding

## 2024-06-19 ENCOUNTER — OFFICE VISIT (OUTPATIENT)
Dept: SURGERY | Facility: CLINIC | Age: 49
End: 2024-06-19
Payer: COMMERCIAL

## 2024-06-19 VITALS
HEART RATE: 71 BPM | WEIGHT: 201 LBS | DIASTOLIC BLOOD PRESSURE: 94 MMHG | OXYGEN SATURATION: 99 % | HEIGHT: 66 IN | SYSTOLIC BLOOD PRESSURE: 158 MMHG | BODY MASS INDEX: 32.3 KG/M2

## 2024-06-19 DIAGNOSIS — R59.0 AXILLARY LYMPHADENOPATHY: Primary | ICD-10-CM

## 2024-06-19 DIAGNOSIS — E66.09 CLASS 1 OBESITY DUE TO EXCESS CALORIES WITH BODY MASS INDEX (BMI) OF 32.0 TO 32.9 IN ADULT, UNSPECIFIED WHETHER SERIOUS COMORBIDITY PRESENT: ICD-10-CM

## 2024-06-19 NOTE — PROGRESS NOTES
"Office Established Patient Note:     Referring Provider: No ref. provider found    Chief Complaint   Patient presents with    Follow-up     Biopsy follow up        Subjective .     History of present illness:  Regi Jesus is a 48 y.o. female well known to me who presents s/p percutaneous lymph node biopsy. No family history of lymphoma. No rapid weight loss, no night sweats, no fevers.     History  Past Medical History:   Diagnosis Date    Dizziness     Dyslipidemia     Elevated cholesterol     Hypertension     Hypokalemia     Palpitations     Periapical abscess     Rita Parkinson White pattern seen on electrocardiogram    ,   Past Surgical History:   Procedure Laterality Date    US GUIDED LYMPH NODE BIOPSY  6/11/2024   ,   Family History   Problem Relation Age of Onset    Breast cancer Mother         40's    Diabetes Mother     Cancer Mother     Cancer Maternal Aunt    ,   Social History     Tobacco Use    Smoking status: Never    Smokeless tobacco: Never   Vaping Use    Vaping status: Never Used   Substance Use Topics    Alcohol use: Yes     Comment: occ    Drug use: Never   , (Not in a hospital admission)   and Allergies:  Patient has no known allergies.    Current Outpatient Medications:     acetaminophen (TYLENOL) 325 MG tablet, Take 2 tablets by mouth Every 6 (Six) Hours As Needed for Mild Pain., Disp: , Rfl:     amLODIPine (NORVASC) 5 MG tablet, Take 1 tablet by mouth Daily., Disp: 90 tablet, Rfl: 3    carvedilol (COREG) 12.5 MG tablet, Take 1 tablet by mouth 2 (Two) Times a Day With Meals., Disp: 180 tablet, Rfl: 1    fluticasone (FLONASE) 50 MCG/ACT nasal spray, Instill 2 sprays into the nostril(s) as directed by provider Daily., Disp: 16 g, Rfl: 1    losartan (COZAAR) 100 MG tablet, Take 1 tablet by mouth Daily., Disp: 90 tablet, Rfl: 3      Objective     Vital Signs   /94   Pulse 71   Ht 167.6 cm (66\")   Wt 91.2 kg (201 lb)   LMP 05/20/2024 (Approximate)   SpO2 99%   BMI 32.44 kg/m²  "     Physical Exam:  General appearance - alert, well appearing, and in no distress  Mental status - alert, oriented to person, place, and time  Eyes - pupils equal and reactive, extraocular eye movements intact  Neck - supple, no significant adenopathy  Chest - no tachypnea, retractions or cyanosis  Heart - normal rate and regular rhythm  Abdomen - soft, nontender, nondistended, no masses or organomegaly  Neurological - alert, oriented, normal speech, no focal findings or movement disorder noted    Results Review:     The following data was reviewed by: Augustina Brown MD on 06/19/2024:  MRI Breast Bilateral Screening With & Without Contrast (06/03/2024 10:27)   US Axilla Left (06/07/2024 12:11)   US Guided Lymph Node Biopsy (06/11/2024 13:59)   Fine Needle Aspiration (06/11/2024 13:05)   Left axillary lymph node, fine-needle core biopsies and touch preparations:  A.  Lymphoid tissue containing a few melanophages.  B.  No abscesses or granulomata identified.  C.  No Jeffrey-Be cells identified.  D.  A few fragments of adipose tissue are also present.  E.  No evidence of primary or metastatic malignancy.       Assessment & Plan       Diagnoses and all orders for this visit:    1. Axillary lymphadenopathy (Primary)  -     US Axilla Left; Future    2. Class 1 obesity due to excess calories with body mass index (BMI) of 32.0 to 32.9 in adult, unspecified whether serious comorbidity present       Will plan for 3 month interval US of the left axilla. If the lymph node is smaller, we will be done. If it is larger we will plan for excision.    Augustina Brown MD  06/23/24  17:00 CDT

## 2024-07-05 ENCOUNTER — TELEPHONE (OUTPATIENT)
Dept: INTERVENTIONAL RADIOLOGY/VASCULAR | Facility: HOSPITAL | Age: 49
End: 2024-07-05

## 2024-09-03 ENCOUNTER — HOSPITAL ENCOUNTER (OUTPATIENT)
Dept: ULTRASOUND IMAGING | Facility: HOSPITAL | Age: 49
Discharge: HOME | End: 2024-09-03
Admitting: STUDENT IN AN ORGANIZED HEALTH CARE EDUCATION/TRAINING PROGRAM
Payer: COMMERCIAL

## 2024-09-03 DIAGNOSIS — R59.0 AXILLARY LYMPHADENOPATHY: ICD-10-CM

## 2024-09-03 PROCEDURE — 76882 US LMTD JT/FCL EVL NVASC XTR: CPT

## 2024-09-18 ENCOUNTER — OFFICE VISIT (OUTPATIENT)
Age: 49
End: 2024-09-18
Payer: COMMERCIAL

## 2024-09-18 VITALS
HEART RATE: 84 BPM | DIASTOLIC BLOOD PRESSURE: 82 MMHG | OXYGEN SATURATION: 97 % | SYSTOLIC BLOOD PRESSURE: 146 MMHG | BODY MASS INDEX: 32.14 KG/M2 | WEIGHT: 200 LBS | HEIGHT: 66 IN

## 2024-09-18 DIAGNOSIS — Z91.89 AT HIGH RISK FOR BREAST CANCER: Primary | ICD-10-CM

## 2024-09-18 PROCEDURE — 99213 OFFICE O/P EST LOW 20 MIN: CPT

## 2024-11-18 DIAGNOSIS — I10 ESSENTIAL HYPERTENSION: ICD-10-CM

## 2024-11-18 RX ORDER — CARVEDILOL 12.5 MG/1
12.5 TABLET ORAL 2 TIMES DAILY WITH MEALS
Qty: 180 TABLET | Refills: 1 | Status: SHIPPED | OUTPATIENT
Start: 2024-11-18

## 2024-12-04 ENCOUNTER — LAB (OUTPATIENT)
Dept: LAB | Facility: HOSPITAL | Age: 49
End: 2024-12-04
Payer: COMMERCIAL

## 2024-12-04 DIAGNOSIS — R73.03 PRE-DIABETES: ICD-10-CM

## 2024-12-04 DIAGNOSIS — I10 ESSENTIAL HYPERTENSION: ICD-10-CM

## 2024-12-04 LAB
ALBUMIN SERPL-MCNC: 4 G/DL (ref 3.5–5.2)
ALBUMIN/GLOB SERPL: 1 G/DL
ALP SERPL-CCNC: 89 U/L (ref 39–117)
ALT SERPL W P-5'-P-CCNC: 9 U/L (ref 1–33)
ANION GAP SERPL CALCULATED.3IONS-SCNC: 12 MMOL/L (ref 5–15)
AST SERPL-CCNC: 13 U/L (ref 1–32)
BASOPHILS # BLD AUTO: 0.03 10*3/MM3 (ref 0–0.2)
BASOPHILS NFR BLD AUTO: 0.5 % (ref 0–1.5)
BILIRUB SERPL-MCNC: 0.3 MG/DL (ref 0–1.2)
BUN SERPL-MCNC: 14 MG/DL (ref 6–20)
BUN/CREAT SERPL: 21.5 (ref 7–25)
CALCIUM SPEC-SCNC: 9 MG/DL (ref 8.6–10.5)
CHLORIDE SERPL-SCNC: 99 MMOL/L (ref 98–107)
CHOLEST SERPL-MCNC: 179 MG/DL (ref 0–200)
CO2 SERPL-SCNC: 26 MMOL/L (ref 22–29)
CREAT SERPL-MCNC: 0.65 MG/DL (ref 0.57–1)
DEPRECATED RDW RBC AUTO: 40.7 FL (ref 37–54)
EGFRCR SERPLBLD CKD-EPI 2021: 109.4 ML/MIN/1.73
EOSINOPHIL # BLD AUTO: 0.07 10*3/MM3 (ref 0–0.4)
EOSINOPHIL NFR BLD AUTO: 1.1 % (ref 0.3–6.2)
ERYTHROCYTE [DISTWIDTH] IN BLOOD BY AUTOMATED COUNT: 13.2 % (ref 12.3–15.4)
GLOBULIN UR ELPH-MCNC: 4 GM/DL
GLUCOSE SERPL-MCNC: 96 MG/DL (ref 65–99)
HBA1C MFR BLD: 6 % (ref 4.8–5.6)
HCT VFR BLD AUTO: 37 % (ref 34–46.6)
HDLC SERPL-MCNC: 38 MG/DL (ref 40–60)
HGB BLD-MCNC: 11.5 G/DL (ref 12–15.9)
IMM GRANULOCYTES # BLD AUTO: 0.02 10*3/MM3 (ref 0–0.05)
IMM GRANULOCYTES NFR BLD AUTO: 0.3 % (ref 0–0.5)
LDLC SERPL CALC-MCNC: 120 MG/DL (ref 0–100)
LDLC/HDLC SERPL: 3.11 {RATIO}
LYMPHOCYTES # BLD AUTO: 2.68 10*3/MM3 (ref 0.7–3.1)
LYMPHOCYTES NFR BLD AUTO: 43.9 % (ref 19.6–45.3)
MCH RBC QN AUTO: 26.4 PG (ref 26.6–33)
MCHC RBC AUTO-ENTMCNC: 31.1 G/DL (ref 31.5–35.7)
MCV RBC AUTO: 84.9 FL (ref 79–97)
MONOCYTES # BLD AUTO: 0.38 10*3/MM3 (ref 0.1–0.9)
MONOCYTES NFR BLD AUTO: 6.2 % (ref 5–12)
NEUTROPHILS NFR BLD AUTO: 2.92 10*3/MM3 (ref 1.7–7)
NEUTROPHILS NFR BLD AUTO: 48 % (ref 42.7–76)
NRBC BLD AUTO-RTO: 0 /100 WBC (ref 0–0.2)
PLATELET # BLD AUTO: 330 10*3/MM3 (ref 140–450)
PMV BLD AUTO: 10 FL (ref 6–12)
POTASSIUM SERPL-SCNC: 3.6 MMOL/L (ref 3.5–5.2)
PROT SERPL-MCNC: 8 G/DL (ref 6–8.5)
RBC # BLD AUTO: 4.36 10*6/MM3 (ref 3.77–5.28)
SODIUM SERPL-SCNC: 137 MMOL/L (ref 136–145)
TRIGL SERPL-MCNC: 114 MG/DL (ref 0–150)
TSH SERPL DL<=0.05 MIU/L-ACNC: 2.39 UIU/ML (ref 0.27–4.2)
VLDLC SERPL-MCNC: 21 MG/DL (ref 5–40)
WBC NRBC COR # BLD AUTO: 6.1 10*3/MM3 (ref 3.4–10.8)

## 2024-12-04 PROCEDURE — 80050 GENERAL HEALTH PANEL: CPT

## 2024-12-04 PROCEDURE — 83036 HEMOGLOBIN GLYCOSYLATED A1C: CPT

## 2024-12-04 PROCEDURE — 36415 COLL VENOUS BLD VENIPUNCTURE: CPT

## 2024-12-04 PROCEDURE — 80061 LIPID PANEL: CPT

## 2024-12-09 ENCOUNTER — OFFICE VISIT (OUTPATIENT)
Dept: INTERNAL MEDICINE | Facility: CLINIC | Age: 49
End: 2024-12-09
Payer: COMMERCIAL

## 2024-12-09 VITALS
HEART RATE: 72 BPM | WEIGHT: 210 LBS | BODY MASS INDEX: 33.75 KG/M2 | SYSTOLIC BLOOD PRESSURE: 138 MMHG | HEIGHT: 66 IN | TEMPERATURE: 97.4 F | OXYGEN SATURATION: 99 % | DIASTOLIC BLOOD PRESSURE: 78 MMHG

## 2024-12-09 DIAGNOSIS — I10 ESSENTIAL HYPERTENSION: ICD-10-CM

## 2024-12-09 DIAGNOSIS — Z91.89 INCREASED RISK OF BREAST CANCER: ICD-10-CM

## 2024-12-09 DIAGNOSIS — Z00.00 ANNUAL PHYSICAL EXAM: Primary | ICD-10-CM

## 2024-12-09 DIAGNOSIS — R73.03 PRE-DIABETES: ICD-10-CM

## 2024-12-09 DIAGNOSIS — E66.9 OBESITY (BMI 30-39.9): ICD-10-CM

## 2024-12-09 PROCEDURE — 99396 PREV VISIT EST AGE 40-64: CPT | Performed by: INTERNAL MEDICINE

## 2024-12-09 PROCEDURE — 99214 OFFICE O/P EST MOD 30 MIN: CPT | Performed by: INTERNAL MEDICINE

## 2024-12-09 NOTE — PROGRESS NOTES
"    Chief Complaint  Annual Exam    Subjective        Regi Jesus presents to [facility]  History of Present Illness  See below.     Objective   Vital Signs:  /78 (BP Location: Left arm, Patient Position: Sitting, Cuff Size: Adult)   Pulse 72   Temp 97.4 °F (36.3 °C) (Temporal)   Ht 167.6 cm (66\")   Wt 95.3 kg (210 lb)   SpO2 99%   BMI 33.89 kg/m²   Estimated body mass index is 33.89 kg/m² as calculated from the following:    Height as of this encounter: 167.6 cm (66\").    Weight as of this encounter: 95.3 kg (210 lb).     BMI is >= 30 and <35. (Class 1 Obesity). The following options were offered after discussion;: weight loss educational material (shared in after visit summary)    Physical Exam  HENT:      Head: Normocephalic and atraumatic.   Eyes:      Conjunctiva/sclera: Conjunctivae normal.      Pupils: Pupils are equal, round, and reactive to light.   Cardiovascular:      Rate and Rhythm: Normal rate and regular rhythm.      Heart sounds: Normal heart sounds.   Pulmonary:      Effort: Pulmonary effort is normal. No respiratory distress.      Breath sounds: Normal breath sounds.   Musculoskeletal:         General: No swelling.   Skin:     General: Skin is warm and dry.      Findings: No rash.   Neurological:      General: No focal deficit present.      Mental Status: She is alert and oriented to person, place, and time.   Psychiatric:         Mood and Affect: Mood normal.         Behavior: Behavior normal.         Thought Content: Thought content normal.         Judgment: Judgment normal.        Result Review :  Labs from 12/4/2024:  1.  CMP normal.  2.  Hemoglobin A1c 6.0.  This was up slightly from 5.7 in March.  It was 6.0 in October of last year.  3.  TSH normal.  4.  Total cholesterol 179, HDL 38, , triglycerides 114.  LDL was 136 on last check.  5.  CBC showed hemoglobin of 11.5, otherwise unremarkable.  Her hemoglobin was 12.1 last October.       "   Assessment and Plan   Diagnoses and all orders for this visit:    1. Annual physical exam (Primary)    2. Essential hypertension    3. Obesity (BMI 30-39.9)    4. Pre-diabetes    5. Increased risk of breast cancer       Presents today for her annual physical exam as well as follow-up of the below.    Blood pressure 138/78 today.  She states that she has been checking it at home and work more often and has been typically getting blood pressures in the 120s-130s systolic over 60s diastolic.  This is much, much better than what we are dealing with last year.  She will continue amlodipine, carvedilol, and losartan.    BMI 33.89.  She has gained about 10 pounds since June.  Increase activity and monitor diet more closely.    Hemoglobin A1c stable.  Pointed out that she should continue to make an effort to avoid carbohydrates and starches.    She had a breast MRI in June.  No suspicious findings.  She did have a lymph node in her left axilla that was then ultrasounded.  She underwent a biopsy of this in June and it was negative.  She follows with the high risk breast clinic.  She sees them again in June.  Anticipate she will have another MRI.    Pap smear was negative in December 2023.  Repeat in December 2026.    DEXA at 62.    Cologuard was negative in January 2024.  Repeat in January 2027.    She already had her flu shot at [facility] in October.     Counseled on appropriate vision and dental screening.  She does not have any vision problems and does not have a regular optometrist.  She is looking to find a dentist that takes her insurance and make a new appointment.    Plan to see her back in 6 months for reevaluation.  She knows that she can reach out sooner if problems.      Follow Up   Return in about 6 months (around 6/9/2025) for Recheck.  Patient was given instructions and counseling regarding her condition or for health maintenance advice. Please see specific information pulled into the AVS if  appropriate.      TERRY Sorenson DO       Electronically signed by CRUZ Sorenson DO, 12/09/24, 8:07 AM CST.

## 2024-12-12 ENCOUNTER — HOSPITAL ENCOUNTER (OUTPATIENT)
Dept: MAMMOGRAPHY | Facility: HOSPITAL | Age: 49
Discharge: HOME | End: 2024-12-12
Payer: COMMERCIAL

## 2024-12-12 DIAGNOSIS — Z12.39 BREAST CANCER SCREENING, HIGH RISK PATIENT: ICD-10-CM

## 2024-12-12 PROCEDURE — 77067 SCR MAMMO BI INCL CAD: CPT

## 2024-12-12 PROCEDURE — 77063 BREAST TOMOSYNTHESIS BI: CPT

## 2024-12-13 NOTE — PROGRESS NOTES
Benign mammogram. No concerning findings. Keep follow up appointment on 6/18/25 for continued appropriate high risk screening management.     Thanks,  Cat REYES

## 2025-02-12 DIAGNOSIS — I10 ESSENTIAL HYPERTENSION: ICD-10-CM

## 2025-02-12 RX ORDER — LOSARTAN POTASSIUM 100 MG/1
100 TABLET ORAL
Qty: 90 TABLET | Refills: 3 | Status: SHIPPED | OUTPATIENT
Start: 2025-02-12

## 2025-05-05 DIAGNOSIS — I10 ESSENTIAL HYPERTENSION: ICD-10-CM

## 2025-05-05 RX ORDER — AMLODIPINE BESYLATE 5 MG/1
5 TABLET ORAL DAILY
Qty: 90 TABLET | Refills: 3 | Status: SHIPPED | OUTPATIENT
Start: 2025-05-05

## 2025-05-15 DIAGNOSIS — I10 ESSENTIAL HYPERTENSION: ICD-10-CM

## 2025-05-15 RX ORDER — CARVEDILOL 12.5 MG/1
12.5 TABLET ORAL 2 TIMES DAILY WITH MEALS
Qty: 180 TABLET | Refills: 1 | Status: SHIPPED | OUTPATIENT
Start: 2025-05-15

## 2025-06-09 ENCOUNTER — OFFICE VISIT (OUTPATIENT)
Dept: INTERNAL MEDICINE | Facility: CLINIC | Age: 50
End: 2025-06-09
Payer: COMMERCIAL

## 2025-06-09 VITALS
TEMPERATURE: 96.6 F | WEIGHT: 211.4 LBS | OXYGEN SATURATION: 97 % | HEART RATE: 80 BPM | HEIGHT: 66 IN | DIASTOLIC BLOOD PRESSURE: 88 MMHG | SYSTOLIC BLOOD PRESSURE: 130 MMHG | BODY MASS INDEX: 33.97 KG/M2

## 2025-06-09 DIAGNOSIS — Z91.89 INCREASED RISK OF BREAST CANCER: ICD-10-CM

## 2025-06-09 DIAGNOSIS — E66.9 OBESITY (BMI 30-39.9): ICD-10-CM

## 2025-06-09 DIAGNOSIS — R73.03 PRE-DIABETES: ICD-10-CM

## 2025-06-09 DIAGNOSIS — I10 ESSENTIAL HYPERTENSION: Primary | ICD-10-CM

## 2025-06-09 LAB
EXPIRATION DATE: ABNORMAL
HBA1C MFR BLD: 5.9 % (ref 4.5–5.7)
Lab: ABNORMAL

## 2025-06-09 PROCEDURE — 99214 OFFICE O/P EST MOD 30 MIN: CPT | Performed by: INTERNAL MEDICINE

## 2025-06-09 PROCEDURE — 83036 HEMOGLOBIN GLYCOSYLATED A1C: CPT | Performed by: INTERNAL MEDICINE

## 2025-06-09 NOTE — PROGRESS NOTES
"    Chief Complaint  Hypertension (6 month follow up)    Subjective        Regi Jesus presents to [facility]  Hypertension    See below.     Objective   Vital Signs:  /88 (BP Location: Left arm, Patient Position: Sitting, Cuff Size: Adult)   Pulse 80   Temp 96.6 °F (35.9 °C) (Temporal)   Ht 167.6 cm (66\")   Wt 95.9 kg (211 lb 6.4 oz)   SpO2 97%   BMI 34.12 kg/m²   Estimated body mass index is 34.12 kg/m² as calculated from the following:    Height as of this encounter: 167.6 cm (66\").    Weight as of this encounter: 95.9 kg (211 lb 6.4 oz).         Physical Exam  HENT:      Head: Normocephalic and atraumatic.   Eyes:      Conjunctiva/sclera: Conjunctivae normal.      Pupils: Pupils are equal, round, and reactive to light.   Cardiovascular:      Rate and Rhythm: Normal rate and regular rhythm.      Heart sounds: Normal heart sounds.   Pulmonary:      Effort: Pulmonary effort is normal. No respiratory distress.      Breath sounds: Normal breath sounds.   Musculoskeletal:         General: No swelling.   Skin:     General: Skin is warm and dry.      Findings: No rash.   Neurological:      General: No focal deficit present.      Mental Status: She is alert and oriented to person, place, and time.   Psychiatric:         Mood and Affect: Mood normal.         Behavior: Behavior normal.         Thought Content: Thought content normal.         Judgment: Judgment normal.        Result Review :  Last labs were in December.         Assessment and Plan   Diagnoses and all orders for this visit:    1. Essential hypertension (Primary)  -     CBC & Differential; Future  -     Comprehensive metabolic panel; Future  -     TSH Rfx On Abnormal To Free T4; Future    2. Pre-diabetes  -     POC Glycated Hemoglobin, Total  -     Hemoglobin A1c; Future    3. Obesity (BMI 30-39.9)  -     Lipid Panel; Future    4. Increased risk of breast cancer       Presents today for " follow-up.    Hemoglobin A1c today is 5.9 ater being 6.0 in December.    Blood pressure seems to be fairly well-controlled at 130/88.  Continue losartan, carvedilol, amlodipine.    Her weight is stable in comparison to December.    She has a breast MRI later this week on 6/12.  She then sees the high risk breast clinic next week.    She would like to get the above fasting labs at the hospital prior to her annual physical in December.  They have been pended to such.    We will see her back in December for her annual.  She knows that she can reach out sooner with any problems.        Follow Up   Return in about 6 months (around 12/9/2025) for Annual physical.  Patient was given instructions and counseling regarding her condition or for health maintenance advice. Please see specific information pulled into the AVS if appropriate.      TERRY Sorenson DO       Electronically signed by CRUZ Sorenson DO, 06/09/25, 7:34 AM CDT.

## 2025-06-12 ENCOUNTER — HOSPITAL ENCOUNTER (OUTPATIENT)
Dept: MRI IMAGING | Facility: HOSPITAL | Age: 50
Discharge: HOME | End: 2025-06-12
Payer: COMMERCIAL

## 2025-06-12 DIAGNOSIS — Z91.89 AT HIGH RISK FOR BREAST CANCER: ICD-10-CM

## 2025-06-12 PROCEDURE — 77049 MRI BREAST C-+ W/CAD BI: CPT

## 2025-06-12 PROCEDURE — A9573 GADOPICLENOL 0.5 MMOL/ML SOLUTION: HCPCS

## 2025-06-12 PROCEDURE — 25510000001 GADOPICLENOL 0.5 MMOL/ML SOLUTION

## 2025-06-12 RX ADMIN — GADOPICLENOL 9.5 ML: 485.1 INJECTION INTRAVENOUS at 09:48

## 2025-06-18 ENCOUNTER — OFFICE VISIT (OUTPATIENT)
Age: 50
End: 2025-06-18
Payer: COMMERCIAL

## 2025-06-18 VITALS
SYSTOLIC BLOOD PRESSURE: 142 MMHG | DIASTOLIC BLOOD PRESSURE: 88 MMHG | WEIGHT: 211 LBS | BODY MASS INDEX: 33.91 KG/M2 | HEART RATE: 72 BPM | OXYGEN SATURATION: 98 % | HEIGHT: 66 IN

## 2025-06-18 DIAGNOSIS — Z91.89 AT HIGH RISK FOR BREAST CANCER: Primary | ICD-10-CM

## 2025-06-18 DIAGNOSIS — Z12.31 SCREENING MAMMOGRAM FOR BREAST CANCER: ICD-10-CM

## 2025-06-18 DIAGNOSIS — Z12.39 BREAST CANCER SCREENING, HIGH RISK PATIENT: ICD-10-CM

## 2025-06-18 NOTE — PROGRESS NOTES
Office Established Patient Note:     Referring Provider: No ref. provider found    Chief Complaint   Patient presents with    Follow-up       Subjective .     History of present illness:  Regi Jesus is a 49 y.o. female who presents to the clinic for 6 month breast follow up. She has a history of been followed in our high risk clinic due to her TC score of 20.1. Today she has no concerns with her breasts. She denies palpable masses, nipple discharge, or skin changes of either breast.     BMI is 34.06. She is a non smoker. She does not take any blood thinners.     Review of Systems    Review of Systems - General ROS: negative  ENT ROS: negative  Respiratory ROS: no cough, shortness of breath, or wheezing  Cardiovascular ROS: no chest pain or dyspnea on exertion  Gastrointestinal ROS: no abdominal pain, change in bowel habits, or black or bloody stools  Genito-Urinary ROS: no dysuria, trouble voiding, or hematuria  Dermatological ROS: negative   Breast ROS: positive for - high risk of breast cancer   Hematological and Lymphatic ROS: negative  Musculoskeletal ROS: negative   Neurological ROS: no TIA or stroke symptoms    Psychological ROS: negative  Endocrine ROS: negative    History  Past Medical History:   Diagnosis Date    Dizziness     Dyslipidemia     Elevated cholesterol     Hypertension     Hypokalemia     Palpitations     Periapical abscess     Rita Parkinson White pattern seen on electrocardiogram    ,   Past Surgical History:   Procedure Laterality Date    US GUIDED LYMPH NODE BIOPSY  6/11/2024   ,   Family History   Problem Relation Age of Onset    Breast cancer Mother         40's    Diabetes Mother     Cancer Mother     Cancer Maternal Aunt    ,   Social History     Tobacco Use    Smoking status: Never    Smokeless tobacco: Never   Vaping Use    Vaping status: Never Used   Substance Use Topics    Alcohol use: Yes     Comment: occ    Drug use: Never   , (Not in a hospital admission)   and Allergies:  " Patient has no known allergies.    Current Outpatient Medications:     acetaminophen (TYLENOL) 325 MG tablet, Take 2 tablets by mouth Every 6 (Six) Hours As Needed for Mild Pain., Disp: , Rfl:     amLODIPine (NORVASC) 5 MG tablet, Take 1 tablet by mouth Daily., Disp: 90 tablet, Rfl: 3    carvedilol (COREG) 12.5 MG tablet, Take 1 tablet by mouth 2 (Two) Times a Day With Meals., Disp: 180 tablet, Rfl: 1    fluticasone (FLONASE) 50 MCG/ACT nasal spray, Instill 2 sprays into the nostril(s) as directed by provider Daily., Disp: 16 g, Rfl: 1    losartan (COZAAR) 100 MG tablet, Take 1 tablet by mouth Daily., Disp: 90 tablet, Rfl: 3    Objective     Vital Signs   /88 (BP Location: Left arm, Patient Position: Sitting, Cuff Size: Adult)   Pulse 72   Ht 167.6 cm (66\")   Wt 95.7 kg (211 lb)   SpO2 98%   BMI 34.06 kg/m²      Physical Exam:  General appearance - alert, well appearing, and in no distress  Mental status - normal mood, behavior, speech, dress, motor activity, and thought processes  Eyes - sclera anicteric  Neck - supple, no significant adenopathy  Chest - no tachypnea, retractions or cyanosis  Heart - normal rate and regular rhythm  Breasts - breasts appear normal, no suspicious masses, no skin or nipple changes or axillary nodes  Neurological - alert, oriented, normal speech, no focal findings or movement disorder noted  Skin - normal coloration and turgor, no rashes, no suspicious skin lesions noted    Results Review:  Result Review :            MRI Breast Bilateral Screening With & Without Contrast (06/12/2025 09:48)   FINDINGS:  Amount of Fibroglandular Tissue: Heterogeneous fibroglandular tissue.  Background Parenchymal Enhancement: Minimal.     No suspicious enhancement of the RIGHT or LEFT breast.     No internal mammary adenopathy. Normal-appearing RIGHT axillary lymph  nodes.      Single abnormally thickened LEFT axillary lymph node, previously  biopsied 6/11/2024.     Partially visualized chest " and upper abdomen appear grossly within  normal limits considering technique, not optimally evaluated on this  exam.        IMPRESSION:  1. No MRI evidence of malignancy in the RIGHT or LEFT breast. Recommend  continued risk appropriate screening.  2. Similar abnormally thickened LEFT axillary lymph node, previously  biopsied 6/11/2024.     BI-RADS CATEGORY 2: Benign findings.  Management Recommendation: Continued risk appropriate screening.    Assessment & Plan       Diagnoses and all orders for this visit:    1. At high risk for breast cancer (Primary)  -     Mammo Screening Digital Tomosynthesis Bilateral With CAD; Future    2. Breast cancer screening, high risk patient    3. Screening mammogram for breast cancer  -     Mammo Screening Digital Tomosynthesis Bilateral With CAD; Future         Regi Jesus is a 49 y.o. female who presents to the clinic for high risk of breast cancer follow up. Her most recent imaging was done on 6/12/25 and showed BIRADS 2 benign findings. At this time, the patient is due for screening mammogram in 6 months. I have placed the order for this. She will follow up in office in December 2025 for discussion of results and clinical breast exam. She is to call for an appointment sooner if she has any new problems or concerns. She voices understanding and is agreeable to the plan.     Follow up:     Return in about 6 months (around 12/18/2025) for 6 month breast f/u .        Cat Perera PA-C  06/18/25  16:14 CDT